# Patient Record
Sex: MALE | Race: WHITE | NOT HISPANIC OR LATINO | Employment: STUDENT | URBAN - METROPOLITAN AREA
[De-identification: names, ages, dates, MRNs, and addresses within clinical notes are randomized per-mention and may not be internally consistent; named-entity substitution may affect disease eponyms.]

---
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
## 2018-09-18 ENCOUNTER — HOSPITAL ENCOUNTER (OUTPATIENT)
Dept: RADIOLOGY | Facility: HOSPITAL | Age: 18
Discharge: HOME | End: 2018-09-18
Attending: ORTHOPAEDIC SURGERY
Payer: COMMERCIAL

## 2018-09-18 ENCOUNTER — OFFICE VISIT (OUTPATIENT)
Dept: SPORTS MEDICINE | Facility: CLINIC | Age: 18
End: 2018-09-18
Payer: COMMERCIAL

## 2018-09-18 VITALS
HEART RATE: 60 BPM | HEIGHT: 73 IN | BODY MASS INDEX: 26.51 KG/M2 | SYSTOLIC BLOOD PRESSURE: 159 MMHG | DIASTOLIC BLOOD PRESSURE: 80 MMHG | WEIGHT: 200 LBS

## 2018-09-18 DIAGNOSIS — M25.521 RIGHT ELBOW PAIN: Primary | ICD-10-CM

## 2018-09-18 DIAGNOSIS — M25.521 RIGHT ELBOW PAIN: ICD-10-CM

## 2018-09-18 PROCEDURE — 99999 PR PBB SHADOW E&M-EST. PATIENT-LVL III: CPT | Mod: PBBFAC,,, | Performed by: ORTHOPAEDIC SURGERY

## 2018-09-18 PROCEDURE — 99204 OFFICE O/P NEW MOD 45 MIN: CPT | Mod: S$GLB,,, | Performed by: ORTHOPAEDIC SURGERY

## 2018-09-18 PROCEDURE — 73080 X-RAY EXAM OF ELBOW: CPT | Mod: TC,FY,PO,RT

## 2018-09-18 PROCEDURE — 73080 X-RAY EXAM OF ELBOW: CPT | Mod: 26,RT,, | Performed by: RADIOLOGY

## 2018-09-18 NOTE — PROGRESS NOTES
"CC: RIGHT elbow pain     18 y.o. Male presents as a new patient to me. 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 Right medial elbow pain x 2-3 mos of atraumatic onset. Pain worsened on Sunday during practice; denies discrete injury or feeling a pop on a particular throw. Did not practice yesterday or today due to pain. Pain is medially based & worse with throwing, terminal flexion/extension and forearm upination/pronation activities. Mild pain with batting. Better with rest. Denies neck pain or radicular symptoms. No shoulder complaints. No prior problems. Treatment thus far has included activity modifications, rest, and oral medication.  Here today to discuss diagnosis and treatment options.     Negative for tobacco.   Negative for diabetes.    Pain Score:   1    PAST MEDICAL HISTORY:   History reviewed. No pertinent past medical history.    PAST SURGICAL HISTORY:  History reviewed. No pertinent surgical history.    FAMILY HISTORY:  No family history on file.    MEDICATIONS:  No current outpatient medications on file.    ALLERGIES:  Review of patient's allergies indicates:  No Known Allergies       REVIEW OF SYSTEMS:  Constitution: Negative. Negative for chills, fever and night sweats.    Hematologic/Lymphatic: Negative for bleeding problem. Does not bruise/bleed easily.   Skin: Negative for dry skin, itching and rash.   Musculoskeletal: Negative for falls. Positive for right elbow pain and  muscle weakness.     PHYSICAL EXAMINATION:  Vitals:  BP (!) 159/80   Pulse 60   Ht 6' 1" (1.854 m)   Wt 90.7 kg (200 lb)   BMI 26.39 kg/m²    General: Well-developed well-nourished 18 y.o. malein no acute distress   Cardiovascular: Regular rhythm by palpation of distal pulse, normal color and temperature, no concerning varicosities on symptomatic side   Lungs: No labored breathing or wheezing appreciated   Neuro: Alert and oriented ×3   Psychiatric: well oriented to person, place and time, demonstrates normal mood " and affect   Skin: No rashes, lesions or ulcers, normal temperature, turgor, and texture on uninvolved extremity    Ortho/SPM Exam  Examination of the right elbow demonstrates full extension and flexion. Point tenderness over the proximal UCL insertion.  Minimal tenderness over the sublime tubercle. Mild medial epicondyle specific tenderness as well. Mild pain with resisted wrist flexion. Normal stability to varus and valgus stress but pain present on valgus stress testing.  Positive milk test for pain over the UCL.  Positive valgus shear test at 30-60 degrees of flexion again over the UCL.  No instability appreciated.  Negative Tinel over the ulnar nerve. Shoulder passive ER to 115, IR to 30 on the right. Passive ER to 90, IR to 60 on the left.  Total arc of motion loss of 5°.  Fairly good core and lower extremity strength. No scapular dyskinesis or winging.  Intact rotator cuff strength. No tenderness over the biceps.    IMAGING:  Xrays including AP/Lateral/Radial Capitellar views are ordered / images reviewed by me:   Small ossific fragment in the region of the proximal UCL suggestive of chronic injury    ASSESSMENT:      ICD-10-CM ICD-9-CM   1. Right elbow pain M25.521 719.42     Exam suspicious for proximal UCL injury, possible acute on chronic    PLAN:     -Recommend MRI to further evaluate   -RTC after study to review images.  Shut down from throwing now.  -Case discussed with team ATC   -All questions answered    Procedures

## 2018-09-18 NOTE — LETTER
September 20, 2018      [facility]            [facility]  [address]  [facility] [address]  Phone: [phone]          Patient: Oskar Lilly   MR Number: [id number]   YOB: 2000   Date of Visit: 9/18/2018       Dear [facility] :    Thank you for referring Oskar Lilly to me for evaluation. Attached you will find relevant portions of my assessment and plan of care.    If you have questions, please do not hesitate to call me. I look forward to following Oskar Lilly along with you.    Sincerely,    CHON Melendez MD    Enclosure  CC:  No Recipients    If you would like to receive this communication electronically, please contact [email or web address] or [phone] to request more information on [employer] access.    For providers and/or their staff who would like to refer a patient to [facility], please contact us through our one-stop-shop provider referral line, Liliya Win, at [phone].    If you feel you have received this communication in error or would no longer like to receive these types of communications, please e-mail [email or web address]

## 2018-09-19 ENCOUNTER — HOSPITAL ENCOUNTER (OUTPATIENT)
Dept: RADIOLOGY | Facility: HOSPITAL | Age: 18
Discharge: HOME | End: 2018-09-19
Attending: ORTHOPAEDIC SURGERY
Payer: COMMERCIAL

## 2018-09-19 DIAGNOSIS — M25.521 RIGHT ELBOW PAIN: ICD-10-CM

## 2018-09-19 PROCEDURE — 73221 MRI JOINT UPR EXTREM W/O DYE: CPT | Mod: 26,RT,, | Performed by: RADIOLOGY

## 2018-09-19 PROCEDURE — 73221 MRI JOINT UPR EXTREM W/O DYE: CPT | Mod: TC,RT

## 2018-09-20 ENCOUNTER — OFFICE VISIT (OUTPATIENT)
Dept: SPORTS MEDICINE | Facility: CLINIC | Age: 18
End: 2018-09-20
Payer: COMMERCIAL

## 2018-09-20 VITALS
WEIGHT: 200 LBS | BODY MASS INDEX: 26.51 KG/M2 | HEART RATE: 59 BPM | HEIGHT: 73 IN | DIASTOLIC BLOOD PRESSURE: 76 MMHG | SYSTOLIC BLOOD PRESSURE: 148 MMHG

## 2018-09-20 DIAGNOSIS — S53.441A SPRAIN OF ULNAR COLLATERAL LIGAMENT OF RIGHT ELBOW, INITIAL ENCOUNTER: Primary | ICD-10-CM

## 2018-09-20 PROCEDURE — 99214 OFFICE O/P EST MOD 30 MIN: CPT | Mod: S$GLB,,, | Performed by: ORTHOPAEDIC SURGERY

## 2018-09-20 PROCEDURE — 3008F BODY MASS INDEX DOCD: CPT | Mod: CPTII,S$GLB,, | Performed by: ORTHOPAEDIC SURGERY

## 2018-09-20 PROCEDURE — 99999 PR PBB SHADOW E&M-EST. PATIENT-LVL III: CPT | Mod: PBBFAC,,, | Performed by: ORTHOPAEDIC SURGERY

## 2018-09-20 NOTE — PROGRESS NOTES
"CC: RIGHT elbow pain     18 y.o. Male presents who presents today to review his elbow MRI. 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     Previous History: Right medial elbow pain x 2-3 mos of atraumatic onset. Pain worsened on Sunday during practice; denies discrete injury or feeling a pop on a particular throw. Did not practice yesterday or today due to pain. Pain is medially based & worse with throwing, terminal flexion/extension and forearm upination/pronation activities. Denies sig pain with batting. Better with rest. Denies neck pain or radicular symptoms. No shoulder complaints. No prior problems. Treatment thus far has included activity modifications, rest, and oral medication. No current discomfort or pain at rest.  No ulnar nerve symptoms.    Negative for tobacco.   Negative for diabetes.    Pain Score:   6    PAST MEDICAL HISTORY:   History reviewed. No pertinent past medical history.    PAST SURGICAL HISTORY:  History reviewed. No pertinent surgical history.    FAMILY HISTORY:  History reviewed. No pertinent family history.    MEDICATIONS:  No current outpatient medications on file.    ALLERGIES:  Review of patient's allergies indicates:  No Known Allergies       REVIEW OF SYSTEMS:  Constitution: Negative. Negative for chills, fever and night sweats.    Hematologic/Lymphatic: Negative for bleeding problem. Does not bruise/bleed easily.   Skin: Negative for dry skin, itching and rash.   Musculoskeletal: Negative for falls. Positive for right elbow pain and  muscle weakness.     PHYSICAL EXAMINATION:  Vitals:  BP (!) 148/76   Pulse (!) 59   Ht 6' 1" (1.854 m)   Wt 90.7 kg (200 lb)   BMI 26.39 kg/m²    General: Well-developed well-nourished 18 y.o. malein no acute distress   Cardiovascular: Regular rhythm by palpation of distal pulse, normal color and temperature, no concerning varicosities on symptomatic side   Lungs: No labored breathing or wheezing appreciated   Neuro: Alert and oriented ×3 "   Psychiatric: well oriented to person, place and time, demonstrates normal mood and affect   Skin: No rashes, lesions or ulcers, normal temperature, turgor, and texture on uninvolved extremity    Ortho/SPM Exam  Examination of the right elbow demonstrates full extension and flexion. Point tenderness over the proximal UCL insertion.  Minimal tenderness over the sublime tubercle. Mild medial epicondyle specific tenderness as well. Mild pain with resisted wrist flexion. Normal stability to varus and valgus stress but pain present on valgus stress testing.  Positive milk test for pain over the UCL.  Positive valgus shear test at 30-60 degrees of flexion again over the UCL.  No instability appreciated.  Negative Tinel over the ulnar nerve. Shoulder passive ER to 115, IR to 30 on the right. Passive ER to 90, IR to 60 on the left.  Total arc of motion loss of 5°.  Fairly good core and lower extremity strength. No scapular dyskinesis or winging.  Intact rotator cuff strength. No tenderness over the biceps.    IMAGING:  Xrays including AP/Lateral/Radial Capitellar of R Elbow views are ordered / images reviewed by me:   Small ossific fragment in the region of the proximal UCL suggestive of chronic injury    MRI of R elbow reviewed by me and discussed with patient. Images show:   1. Suspected partial tear of the proximal UCL, likely chronic or acute on chronic.  Consider further evaluation with MRI arthrogram.   2. Mild myotendinous edema of the common flexor tendon.    Agree with acute on chronic partial tear of the UCL proximally.    ASSESSMENT:      ICD-10-CM ICD-9-CM   1. Sprain of ulnar collateral ligament of right elbow, initial encounter S53.441A 841.1       PLAN:     MRI findings were discussed with the patient. This is a proximal sided partial lesion, acute on chronic.  In this case, an initial trial of conservative treatment is recommended.  Discussed a forced period of rest for the next 6 weeks followed by a 6 week  period of gradual progression back to full throwing.  Also discussed possible PRP injection now.  The patient would like to hold off on this.  Not a necessity from my standpoint.  The patient denies any pain with hitting and I think he can participate in that now.  Plan discussed with his  and also the .  He is in agreement.  He understands the inherent risk for continued or recurrent problems eventually requiring surgery. We will continue to follow along closely.    Oskar denies desire for me to speak with family.    Procedures

## 2018-09-24 ENCOUNTER — TELEPHONE (OUTPATIENT)
Dept: SPORTS MEDICINE | Facility: CLINIC | Age: 18
End: 2018-09-24

## 2018-09-24 DIAGNOSIS — S53.449A: Primary | ICD-10-CM

## 2018-09-28 ENCOUNTER — CLINICAL SUPPORT (OUTPATIENT)
Dept: REHABILITATION | Facility: HOSPITAL | Age: 18
End: 2018-09-28
Attending: ORTHOPAEDIC SURGERY
Payer: COMMERCIAL

## 2018-09-28 DIAGNOSIS — M25.621 ELBOW STIFFNESS, RIGHT: ICD-10-CM

## 2018-09-28 DIAGNOSIS — M25.521 ELBOW PAIN, RIGHT: ICD-10-CM

## 2018-09-28 DIAGNOSIS — M62.81 MUSCLE WEAKNESS OF RIGHT UPPER EXTREMITY: ICD-10-CM

## 2018-09-28 PROCEDURE — 97161 PT EVAL LOW COMPLEX 20 MIN: CPT | Performed by: PHYSICAL THERAPIST

## 2018-09-28 PROCEDURE — 97110 THERAPEUTIC EXERCISES: CPT | Performed by: PHYSICAL THERAPIST

## 2018-09-29 PROBLEM — M62.81 MUSCLE WEAKNESS OF RIGHT UPPER EXTREMITY: Status: ACTIVE | Noted: 2018-09-29

## 2018-09-29 PROBLEM — M25.621 ELBOW STIFFNESS, RIGHT: Status: ACTIVE | Noted: 2018-09-29

## 2018-09-29 PROBLEM — M25.521 ELBOW PAIN, RIGHT: Status: ACTIVE | Noted: 2018-09-29

## 2018-09-29 NOTE — PLAN OF CARE
"[facility]  Physical Therapy Initial Evaluation    Name: Tabitha Lilly  Clinic Number: [id number]    Therapy Diagnosis:   Encounter Diagnoses   Name Primary?    Elbow pain, right     Elbow stiffness, right     Muscle weakness of right upper extremity      Physician: CHON Melendez MD    Physician Orders: PT Eval and Treat   Medical Diagnosis: S53.449A (ICD-10-CM) - Sprain of ulnar collateral ligament of elbow, unspecified laterality, initial encounter  Evaluation Date: 9/28/2018  Authorization Period Expiration: 12/31//2018  Plan of Care Certification Period: 11/28/2018  Visit # / Visits authorized: 1 / 20    Time In: 16:00  Time Out: 17:00  Total Billable Time: 60 minutes    Precautions: Standard    Subjective   Date of onset: 2-3 month hx   History of current condition - TABITHA reports progressive worsening of R elbow pain from throwing a baseball leading him to see [facility] (+) for above dx and pt referred to PT     No past medical history on file.  Tabitha Lilly  has no past surgical history on file.    Tabitha currently has no medications in their medication list.    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  Prior Level of Function: I  Current Level of Function: pain with ADLs, recreation / leisure    Pts goals: "Heal it, prevent future problems"    Objective     Observation: slight guarding R e' / UE, no muscle atrophy or effusion, fair unsupported sitting posture    No overt scap dyskiensis    Range of Motion (extension/neutral/flexion):    Right Left   Elbow PROM: 0 / 15 / 135 degrees 0 / 5 / 140 degrees     PROM s' ER R 121 L 101  IR R 47 L 60  FIR -5"      Strength:    Right Left " Comment   Elbow Extension: 4-/5 5/5    Elbow Flexion: 4-/5 5/5    Forearm supination 4-/5 5/5    Forearm pronation  4-/5 5/5      Special Tests: (+) moving valgus stress test    Palpation: (+) TTP UCL region R elbow     TREATMENT   Treatment Time In: 16:30  Treatment Time Out: 16:45  Total Treatment time separate from Evaluation time:15'     OSKAR received therapeutic exercises to develop ROM and flexibility for 15 minutes including:    UBE 6' 3.0   w' flex stretch 3x:15  Tennis e' stretch 3x:15  Stick forearm   Cross body stretch 5x;15  FIR 5x:15  Sleeper stretch 5x:15  Sleeper hang 2'x2 2#    CP to go    Education     Home Exercises and Patient Education Provided    Education provided re:   - progress towards goals   - role of therapy in multi - disciplinary team, goals for therapy  No spiritual or educational barriers to learning provided    Written Home Exercises Provided: yes.  Exercises were reviewed and OSKAR was able to demonstrate them prior to the end of the session.   Pt received a written copy of exercises to perform at home. OSKAR demonstrated good  understanding of the education provided.     Assessment   Oskar is a 18 y.o. male referred to outpatient Physical Therapy with a medical diagnosis of R e' UCL sprain . Pt presents with     Pain  Stiffness  Decreased ROM  Decreased strength  Decreased postural awareness  Decreased functional status     Pt prognosis is Good.   Pt will benefit from skilled outpatient Physical Therapy to address the deficits stated above and in the chart below, provide pt/family education, and to maximize pt's level of independence.     Plan of care discussed with patient: Yes  Pt's spiritual, cultural and educational needs considered and pt agreeable to plan of care and goals as stated below:     Anticipated Barriers for therapy: none    Medical Necessity is demonstrated by the following  History  Co-morbidities and personal factors that may impact the plan of care Co-morbidities:    none    Personal Factors:   no deficits     low   Examination  Body Structures and Functions, activity limitations and participation restrictions that may impact the plan of care Body Regions:   upper extremities    Body Systems:    ROM  strength  motor control    Participation Restrictions:   Recreation/ leisure    Activity limitations:   Mobility  lifting and carrying objects    Self care  no deficits    Domestic Life  no deficits    Community and Social Life  recreation and leisure         low   Clinical Presentation stable and uncomplicated low   Decision Making/ Complexity Score: low     Goals   Short-Term Goals: 4 weeks  - The patient will be independent with initial home exercise program.  - The patient will demonstrate good unsupported sitting posture with min verbal cues for 30 minutes.  - The patient will increase ROM = to L elbow to perform ADLs  with pain < 0/10.  - The patient will increase strength by 1/2 muscle grade  to perform ADLs with pain < 0/10.    Long-Term Goals: 8 weeks  - The patient will be independent with home exercise program and symptom management.  - The patient will increase strength = to L UE to perform recreation / leisure with pain < 0/10      Plan   Certification Period: 9/28/2018 to 11/28/2018    Outpatient Physical Therapy 1 times weekly for 8 weeks to include the following interventions: patient education, Manual Therapy, Moist Heat/ Ice, Neuromuscular Re-ed, Therapeutic Activites and Therapeutic Exercise.     Eduin Padilla, PT

## 2018-10-03 ENCOUNTER — CLINICAL SUPPORT (OUTPATIENT)
Dept: REHABILITATION | Facility: HOSPITAL | Age: 18
End: 2018-10-03
Attending: ORTHOPAEDIC SURGERY
Payer: COMMERCIAL

## 2018-10-03 DIAGNOSIS — M62.81 MUSCLE WEAKNESS OF RIGHT UPPER EXTREMITY: ICD-10-CM

## 2018-10-03 DIAGNOSIS — M25.521 ELBOW PAIN, RIGHT: Primary | ICD-10-CM

## 2018-10-03 DIAGNOSIS — M25.621 ELBOW STIFFNESS, RIGHT: ICD-10-CM

## 2018-10-03 PROCEDURE — 97110 THERAPEUTIC EXERCISES: CPT | Performed by: PHYSICAL THERAPIST

## 2018-10-04 NOTE — PROGRESS NOTES
Physical Therapy Daily Treatment Note     Visit Date: 10/3/2018    Name: Oskar Lilly  Clinic Number: [id number]    Therapy Diagnosis:   Encounter Diagnoses   Name Primary?    Elbow pain, right Yes    Elbow stiffness, right     Muscle weakness of right upper extremity      Physician: CHON Melendez MD      Physician Orders: PT Eval and Treat   Medical Diagnosis: S53.449A (ICD-10-CM) - Sprain of ulnar collateral ligament of elbow, unspecified laterality, initial encounter  Evaluation Date: 9/28/2018  Authorization Period Expiration: 12/31//2018  Plan of Care Certification Period: 11/28/2018  Visit # / Visits authorized: 1 / 20    Time In: 16:00  Time Out: 17:00  Total Billable Time: 60 minutes    Precautions: Standard    Subjective      Pt reports continued soreness in R elbow this PM    he was compliant with home exercise program given last session.   Response to previous treatment:good   Functional change: none    Pain: 5/10  Location: right elbow      Objective     Measurements taken:      OSKAR received therapeutic exercises to develop strength, endurance, ROM, flexibility and core stabilization for 45  minutes including:    Stick forearm volar/dorsal surfaces  w' flexor stretch 3x:15  Tennis elbow stretch 3x:15  scaption 3x10-15 3#  TB ER 3xburn green   TB IR 3x15 blue   Plank push up plus 3x5  S/L ER 3x burn 3#  Prone s' ext/ER 3x15 2#  Prone row 3x15 4#  Prone HAB/ER 3x10 1#    CP to go R e'    Home Exercises Provided and Patient Education Provided     Education provided:   - yes    Written Home Exercises Provided: .RC/scap program   Exercises were reviewed and OSKAR was able to demonstrate them prior to the end of the session.  OSKAR demonstrated good \ understanding of the education provided.     See EMR under hand out given to pt for exercises provided .      Assessment     nicole Rx without increase in sxs  Pain scale 2/10 in R e' post Rx (decreased)    OSKAR is progressing well towards his goals.   Pt  prognosis is Good.     Pt will continue to benefit from skilled outpatient physical therapy to address the deficits listed in the problem list box on initial evaluation, provide pt/family education and to maximize pt's level of independence in the home and community environment.     Pt's spiritual, cultural and educational needs considered and pt agreeable to plan of care and goals.    Anticipated barriers to physical therapy: none    Goals:     Goals   Short-Term Goals: 4 weeks  - The patient will be independent with initial home exercise program.  - The patient will demonstrate good unsupported sitting posture with min verbal cues for 30 minutes.  - The patient will increase ROM = to L elbow to perform ADLs  with pain < 0/10.  - The patient will increase strength by 1/2 muscle grade  to perform ADLs with pain < 0/10.    Long-Term Goals: 8 weeks  - The patient will be independent with home exercise program and symptom management.  - The patient will increase strength = to L UE to perform recreation / leisure with pain < 0/10      Plan     Continue with established Plan of Care towards PT goals with focus on decreasing pain, increasing ROM, strength, neuromuscular control and functional status       Eduin Padilla, PT

## 2018-10-10 ENCOUNTER — CLINICAL SUPPORT (OUTPATIENT)
Dept: REHABILITATION | Facility: HOSPITAL | Age: 18
End: 2018-10-10
Attending: ORTHOPAEDIC SURGERY
Payer: COMMERCIAL

## 2018-10-10 DIAGNOSIS — M25.521 ELBOW PAIN, RIGHT: Primary | ICD-10-CM

## 2018-10-10 DIAGNOSIS — M62.81 MUSCLE WEAKNESS OF RIGHT UPPER EXTREMITY: ICD-10-CM

## 2018-10-10 DIAGNOSIS — M25.621 ELBOW STIFFNESS, RIGHT: ICD-10-CM

## 2018-10-10 PROCEDURE — 97110 THERAPEUTIC EXERCISES: CPT | Performed by: PHYSICAL THERAPIST

## 2018-10-10 NOTE — PROGRESS NOTES
Physical Therapy Daily Treatment Note     Visit Date: 10/10/2018    Name: Oskar Lilly  Clinic Number: [id number]    Therapy Diagnosis:   No diagnosis found.  Physician: CHON Melendez MD      Physician Orders: PT Eval and Treat   Medical Diagnosis: S53.449A (ICD-10-CM) - Sprain of ulnar collateral ligament of elbow, unspecified laterality, initial encounter  Evaluation Date: 9/28/2018  Authorization Period Expiration: 12/31//2018  Plan of Care Certification Period: 11/28/2018  Visit # / Visits authorized: 3 / 20    Time In: 12:00  Time Out: 12:30  Total Billable Time: 30 minutes    Precautions: Standard    Subjective      Pt reports continued soreness in R elbow this PM    he was compliant with home exercise program given last session.   Response to previous treatment:good   Functional change: none    Pain: 1/10  (improved)  Location: right elbow      Objective     Measurements taken:   None taken this PM     OSKAR received therapeutic exercises to develop strength, endurance, ROM, flexibility and core stabilization for 45  minutes including:    Stick forearm volar/dorsal surfaces  w' flexor stretch 3x:15  Tennis elbow stretch 3x:15  MR e' flex 3D 3x15  MR E' ext 2D 3x15  MR sup/pron 3x15  MR w' 4D 3xburn   MR finger flex/ext 3x15    CP to go R e'    Home Exercises Provided and Patient Education Provided     Education provided:   - yes    Written Home Exercises Provided: .RC/scap program   Exercises were reviewed and OSKAR was able to demonstrate them prior to the end of the session.  OSKAR demonstrated good \ understanding of the education provided.     See EMR under hand out given to pt for exercises provided .      Assessment     nicole Rx without increase in sxs  Significant improvement in e' sxs overall     OSKAR is progressing well towards his goals.   Pt prognosis is Good.     Pt will continue to benefit from skilled outpatient physical therapy to address the deficits listed in the problem list box on initial  evaluation, provide pt/family education and to maximize pt's level of independence in the home and community environment.     Pt's spiritual, cultural and educational needs considered and pt agreeable to plan of care and goals.    Anticipated barriers to physical therapy: none    Goals:     Goals   Short-Term Goals: 4 weeks  - The patient will be independent with initial home exercise program.  - The patient will demonstrate good unsupported sitting posture with min verbal cues for 30 minutes.  - The patient will increase ROM = to L elbow to perform ADLs  with pain < 0/10.  - The patient will increase strength by 1/2 muscle grade  to perform ADLs with pain < 0/10.    Long-Term Goals: 8 weeks  - The patient will be independent with home exercise program and symptom management.  - The patient will increase strength = to L UE to perform recreation / leisure with pain < 0/10      Plan     Continue with established Plan of Care towards PT goals with focus on decreasing pain, increasing ROM, strength, neuromuscular control and functional status       Eduin Padilla, PT

## 2018-10-17 ENCOUNTER — CLINICAL SUPPORT (OUTPATIENT)
Dept: REHABILITATION | Facility: HOSPITAL | Age: 18
End: 2018-10-17
Attending: ORTHOPAEDIC SURGERY
Payer: COMMERCIAL

## 2018-10-17 DIAGNOSIS — M25.621 ELBOW STIFFNESS, RIGHT: ICD-10-CM

## 2018-10-17 DIAGNOSIS — M25.521 ELBOW PAIN, RIGHT: Primary | ICD-10-CM

## 2018-10-17 DIAGNOSIS — M62.81 MUSCLE WEAKNESS OF RIGHT UPPER EXTREMITY: ICD-10-CM

## 2018-10-17 PROCEDURE — 97110 THERAPEUTIC EXERCISES: CPT | Performed by: PHYSICAL THERAPIST

## 2018-10-17 NOTE — PROGRESS NOTES
"  Physical Therapy Daily Treatment Note     Visit Date: 10/17/2018    Name: Oskar Lilly  Clinic Number: [id number]    Therapy Diagnosis:   Encounter Diagnoses   Name Primary?    Elbow pain, right Yes    Elbow stiffness, right     Muscle weakness of right upper extremity      Physician: CHON Melendez MD      Physician Orders: PT Eval and Treat   Medical Diagnosis: S53.449A (ICD-10-CM) - Sprain of ulnar collateral ligament of elbow, unspecified laterality, initial encounter  Evaluation Date: 9/28/2018  Authorization Period Expiration: 12/31//2018  Plan of Care Certification Period: 11/28/2018  Visit # / Visits authorized: 4 / 20    Time In: 11:30  Time Out: 12:30  Total Billable Time: 45 minutes    Precautions: Standard    Subjective     Pt reports decreased soreness in R elbow this  AM, "It's been pain free for 5 days"     he was compliant with home exercise program given last session.   Response to previous treatment:good   Functional change: none    Pain: 0/10  (improved)  Location: right elbow      Objective     Measurements taken:   PROM e' flex reproduced pain in e'     Oskar received therapeutic exercises to develop strength, endurance, ROM, flexibility and core stabilization for 45  minutes including:    Lacrosse ball  forearm volar/dorsal surfaces  y-t-w 3x10 2#  w' flexor stretch 3x:15  Tennis elbow stretch 3x:15  STM medial elbow flexor / pronatior region   MR e' flex 3D 3x15  MR E' ext 2D 3x15  MR sup/pron 3x15  MR S/L ER 3xburn  MR prone s' ext / ER superset to prone row 3x15   MR w' 4D 3xburn         CP to go R e'    Home Exercises Provided and Patient Education Provided     Education provided:   - yes    Written Home Exercises Provided: .RC/scap program   Exercises were reviewed and Oskar was able to demonstrate them prior to the end of the session.  Oskar demonstrated good \ understanding of the education provided.     See EMR under hand out given to pt for exercises provided .      Assessment "     nicole Rx without increase in sxs  Decreased pain with PROM e' flex following STM     Oskar is progressing well towards his goals.   Pt prognosis is Good.     Pt will continue to benefit from skilled outpatient physical therapy to address the deficits listed in the problem list box on initial evaluation, provide pt/family education and to maximize pt's level of independence in the home and community environment.     Pt's spiritual, cultural and educational needs considered and pt agreeable to plan of care and goals.    Anticipated barriers to physical therapy: none    Goals:     Goals   Short-Term Goals: 4 weeks  - The patient will be independent with initial home exercise program.  - The patient will demonstrate good unsupported sitting posture with min verbal cues for 30 minutes.  - The patient will increase ROM = to L elbow to perform ADLs  with pain < 0/10.  - The patient will increase strength by 1/2 muscle grade  to perform ADLs with pain < 0/10.    Long-Term Goals: 8 weeks  - The patient will be independent with home exercise program and symptom management.  - The patient will increase strength = to L UE to perform recreation / leisure with pain < 0/10      Plan     Continue with established Plan of Care towards PT goals with focus on decreasing pain, increasing ROM, strength, neuromuscular control and functional status     Prime  strengthening or UE plyometrics if no pain with PROM e' flex     Eduin Padilla, PT

## 2018-10-24 ENCOUNTER — CLINICAL SUPPORT (OUTPATIENT)
Dept: REHABILITATION | Facility: HOSPITAL | Age: 18
End: 2018-10-24
Attending: ORTHOPAEDIC SURGERY
Payer: COMMERCIAL

## 2018-10-24 DIAGNOSIS — M62.81 MUSCLE WEAKNESS OF RIGHT UPPER EXTREMITY: ICD-10-CM

## 2018-10-24 DIAGNOSIS — M25.621 ELBOW STIFFNESS, RIGHT: ICD-10-CM

## 2018-10-24 DIAGNOSIS — M25.521 ELBOW PAIN, RIGHT: Primary | ICD-10-CM

## 2018-10-24 PROCEDURE — 97530 THERAPEUTIC ACTIVITIES: CPT | Performed by: PHYSICAL THERAPIST

## 2018-10-24 NOTE — PROGRESS NOTES
Physical Therapy Daily Treatment Note     Visit Date: 10/24/2018    Name: Oskar Lilly  Clinic Number: [id number]    Therapy Diagnosis:   Encounter Diagnoses   Name Primary?    Elbow pain, right Yes    Elbow stiffness, right     Muscle weakness of right upper extremity      Physician: CHON Melendez MD      Physician Orders: PT Eval and Treat   Medical Diagnosis: S53.449A (ICD-10-CM) - Sprain of ulnar collateral ligament of elbow, unspecified laterality, initial encounter  Evaluation Date: 9/28/2018  Authorization Period Expiration: 12/31//2018  Plan of Care Certification Period: 11/28/2018  Visit # / Visits authorized: 5 / 20    Time In: 11:30  Time Out: 12:30  Total Billable Time: 45 minutes    Precautions: Standard    Subjective     Pt reports no c/o in R e' this AM, willing to attempt Xavi as tolerated     he was compliant with home exercise program given last session.   Response to previous treatment:good   Functional change: none    Pain: 0/10  (improved)  Location: right elbow      Objective     Measurements taken:  Again  PROM e' flex reproduced pain in e'     Patient participated in dynamic functional therapeutic activities to improve functional performance for  45 minutes. Including:     stick  forearm volar/dorsal surfaces  TB ER 90-90 plyo blue 3xburn superset to w' ext plyo 3xburn  TB IR 90-90 plum 3x15 superset to w' flex plyo 3xburn   bblade up and POS 3xburn medium  w' flexor stretch 3x:15  Tennis elbow stretch 3x:15  STM medial elbow flexor / pronatior region   UE plyo series:  CP   3x20 8lbs  diag 3x10 2kg  OH 3x10 2kg  90-90 red 3x20  S/L ER 3xburn red  Prone ER 90-90 3xburn red  Ball drop w' ext 3xburn red  w' flex plyo red 3x30    CP to go R e'    Home Exercises Provided and Patient Education Provided     Education provided:   - yes    Written Home Exercises Provided: .RC/scap program   Exercises were reviewed and Oskar was able to demonstrate them prior to the end of the session.  Oskar  demonstrated good \ understanding of the education provided.     See EMR under hand out given to pt for exercises provided .      Assessment     nicole Rx without increase in sxs able to perform UE plyometrics without pain in R e'       Oskar is progressing well towards his goals.   Pt prognosis is Good.     Pt will continue to benefit from skilled outpatient physical therapy to address the deficits listed in the problem list box on initial evaluation, provide pt/family education and to maximize pt's level of independence in the home and community environment.     Pt's spiritual, cultural and educational needs considered and pt agreeable to plan of care and goals.    Anticipated barriers to physical therapy: none    Goals:     Goals   Short-Term Goals: 4 weeks  - The patient will be independent with initial home exercise program.  - The patient will demonstrate good unsupported sitting posture with min verbal cues for 30 minutes.  - The patient will increase ROM = to L elbow to perform ADLs  with pain < 0/10.  - The patient will increase strength by 1/2 muscle grade  to perform ADLs with pain < 0/10.    Long-Term Goals: 8 weeks  - The patient will be independent with home exercise program and symptom management.  - The patient will increase strength = to L UE to perform recreation / leisure with pain < 0/10      Plan     Continue with established Plan of Care towards PT goals with focus on decreasing pain, increasing ROM, strength, neuromuscular control and functional status     Prime  strengthening or UE plyometrics     Eduin Padilla, PT

## 2018-11-07 ENCOUNTER — CLINICAL SUPPORT (OUTPATIENT)
Dept: REHABILITATION | Facility: HOSPITAL | Age: 18
End: 2018-11-07
Attending: ORTHOPAEDIC SURGERY
Payer: COMMERCIAL

## 2018-11-07 DIAGNOSIS — M62.81 MUSCLE WEAKNESS OF RIGHT UPPER EXTREMITY: ICD-10-CM

## 2018-11-07 DIAGNOSIS — M25.621 ELBOW STIFFNESS, RIGHT: ICD-10-CM

## 2018-11-07 DIAGNOSIS — M25.521 ELBOW PAIN, RIGHT: Primary | ICD-10-CM

## 2018-11-07 PROCEDURE — 97110 THERAPEUTIC EXERCISES: CPT | Performed by: PHYSICAL THERAPIST

## 2018-11-08 NOTE — PROGRESS NOTES
Physical Therapy Daily Treatment Note     Visit Date: 11/7/2018    Name: Oskar Lilly  Clinic Number: [id number]    Therapy Diagnosis:   Encounter Diagnoses   Name Primary?    Elbow pain, right Yes    Elbow stiffness, right     Muscle weakness of right upper extremity      Physician: CHON Melendez MD      Physician Orders: PT Eval and Treat   Medical Diagnosis: S53.449A (ICD-10-CM) - Sprain of ulnar collateral ligament of elbow, unspecified laterality, initial encounter  Evaluation Date: 9/28/2018  Authorization Period Expiration: 12/31//2018  Plan of Care Certification Period: 11/28/2018  Visit # / Visits authorized: 6 / 20    Time In: 10:30  Time Out: 11:30  Total Billable Time: 45 minutes    Precautions: Standard    Subjective     Pt reports no change in R e' condition this AM    he was compliant with home exercise program given last session.   Response to previous treatment:good   Functional change: none    Pain: 0/10  At rest   Location: right elbow      Objective     Measurements taken:  Pain reproduction continues with moving valgus stress test    Patient received  therapy to increase strength x 45'  with  activities as follows:     stick  forearm volar/dorsal surfaces  TB ER 90-90 plyo blue 3xburn superset to w' ext plyo 3xburn  TB IR 90-90 plum 3x15 superset to w' flex plyo 3xburn   Push ups 3x10 1/2 range to wt bench   DB BP 1/2 range on green PB 3x15 20#  CC LPD 6p 3x15  CC row 6p 3x15  CC U tri press 3-4p 3xburn R/L   CC U bi curl  3-4p 3xburn R/L   S/L ER 4# 3xburn R/L     CP to go R e'    Home Exercises Provided and Patient Education Provided     Education provided:   - yes    Written Home Exercises Provided: .RC/scap program   Exercises were reviewed and Oskar was able to demonstrate them prior to the end of the session.  Oskar demonstrated good \ understanding of the education provided.     See EMR under hand out given to pt for exercises provided .      Assessment     nicole Rx without increase in  sxs able to perform UE prime  strengthening without sxs in R e'       Ross is progressing well towards his goals.   Pt prognosis is Good.     Pt will continue to benefit from skilled outpatient physical therapy to address the deficits listed in the problem list box on initial evaluation, provide pt/family education and to maximize pt's level of independence in the home and community environment.     Pt's spiritual, cultural and educational needs considered and pt agreeable to plan of care and goals.    Anticipated barriers to physical therapy: none    Goals:     Goals   Short-Term Goals: 4 weeks  - The patient will be independent with initial home exercise program.  - The patient will demonstrate good unsupported sitting posture with min verbal cues for 30 minutes.  - The patient will increase ROM = to L elbow to perform ADLs  with pain < 0/10.  - The patient will increase strength by 1/2 muscle grade  to perform ADLs with pain < 0/10.    Long-Term Goals: 8 weeks  - The patient will be independent with home exercise program and symptom management.  - The patient will increase strength = to L UE to perform recreation / leisure with pain < 0/10      Plan     Per MD visit 9/20:  Discussed a forced period of rest for the next 6 weeks followed by a 6 week period of gradual progression back to full throwing.  Also discussed possible PRP injection now.      Continue with established Plan of Care towards PT goals with focus on decreasing pain, increasing ROM, strength, neuromuscular control and functional status     Eduin Padilla, PT

## 2018-11-14 ENCOUNTER — CLINICAL SUPPORT (OUTPATIENT)
Dept: REHABILITATION | Facility: HOSPITAL | Age: 18
End: 2018-11-14
Attending: ORTHOPAEDIC SURGERY
Payer: COMMERCIAL

## 2018-11-14 DIAGNOSIS — M62.81 MUSCLE WEAKNESS OF RIGHT UPPER EXTREMITY: ICD-10-CM

## 2018-11-14 DIAGNOSIS — M25.621 ELBOW STIFFNESS, RIGHT: ICD-10-CM

## 2018-11-14 DIAGNOSIS — M25.521 ELBOW PAIN, RIGHT: Primary | ICD-10-CM

## 2018-11-14 PROCEDURE — 97530 THERAPEUTIC ACTIVITIES: CPT | Performed by: PHYSICAL THERAPIST

## 2018-11-14 NOTE — PROGRESS NOTES
Physical Therapy Daily Treatment Note     Visit Date: 11/14/2018    Name: Oskar Lilly  Clinic Number: [id number]    Therapy Diagnosis:   Encounter Diagnoses   Name Primary?    Elbow pain, right Yes    Elbow stiffness, right     Muscle weakness of right upper extremity      Physician: CHON Melendez MD      Physician Orders: PT Eval and Treat   Medical Diagnosis: S53.449A (ICD-10-CM) - Sprain of ulnar collateral ligament of elbow, unspecified laterality, initial encounter  Evaluation Date: 9/28/2018  Authorization Period Expiration: 12/31//2018  Plan of Care Certification Period: 11/28/2018  Visit # / Visits authorized: 7 / 20    Time In: 10:30  Time Out: 11:30  Total Billable Time: 45 minutes    Precautions: Standard    Subjective     Pt reports again, no change in R elbow condition    he was compliant with home exercise program given last session.   Response to previous treatment:good   Functional change: none    Pain: 0/10  At rest   Location: right elbow      Objective     Measurements taken:  Pain reproduction continues with moving valgus stress test    Patient participated in dynamic functional therapeutic activities to improve functional performance for  45 minutes. Including:     stick  forearm volar/dorsal surfaces  TB ER 90-90 plyo blue 3xburn superset to w' ext plyo 3xburn  TB IR 90-90 plum 3x15 superset to w' flex plyo 3xburn   bblade up and POS 3xburn medium  w' flexor stretch 3x:15  Tennis elbow stretch 3x:15  STM medial elbow flexor / pronatior region   UE plyo series:  CP   3x20 8lbs  diag 3x10 2kg  OH 3x10 2kg  90-90 red 3x20  S/L ER 3xburn red  Prone ER 90-90 3xburn red  Ball drop w' ext 3xburn red  w' flex plyo red 3x30    CP to go R e'    Home Exercises Provided and Patient Education Provided     Education provided:   - yes    Written Home Exercises Provided: .RC/scap program   Exercises were reviewed and Oskar was able to demonstrate them prior to the end of the session.  Oskar demonstrated  "good \ understanding of the education provided.     See EMR under hand out given to pt for exercises provided .      Assessment     nicole Rx without increase in sxs  Again, tolerated UE plyo program without sxs in SIRI Schmitt is progressing well towards his goals.   Pt prognosis is Good.     Pt will continue to benefit from skilled outpatient physical therapy to address the deficits listed in the problem list box on initial evaluation, provide pt/family education and to maximize pt's level of independence in the home and community environment.     Pt's spiritual, cultural and educational needs considered and pt agreeable to plan of care and goals.    Anticipated barriers to physical therapy: none    Goals:     Goals   Short-Term Goals: 4 weeks  - The patient will be independent with initial home exercise program.  - The patient will demonstrate good unsupported sitting posture with min verbal cues for 30 minutes.  - The patient will increase ROM = to L elbow to perform ADLs  with pain < 0/10.  - The patient will increase strength by 1/2 muscle grade  to perform ADLs with pain < 0/10.    Long-Term Goals: 8 weeks  - The patient will be independent with home exercise program and symptom management.  - The patient will increase strength = to L UE to perform recreation / leisure with pain < 0/10      Plan     "Per MD visit 9/20:  Discussed a forced period of rest for the next 6 weeks followed by a 6 week period of gradual progression back to full throwing.  Also discussed possible PRP injection now"    Begin soft toss NV if appropriate .      Continue with established Plan of Care towards PT goals with focus on decreasing pain, increasing ROM, strength, neuromuscular control and functional status     Eduin Padilla, PT   "

## 2018-12-04 ENCOUNTER — OFFICE VISIT (OUTPATIENT)
Dept: SPORTS MEDICINE | Facility: CLINIC | Age: 18
End: 2018-12-04
Payer: COMMERCIAL

## 2018-12-04 VITALS
WEIGHT: 200 LBS | BODY MASS INDEX: 26.51 KG/M2 | HEIGHT: 73 IN | SYSTOLIC BLOOD PRESSURE: 152 MMHG | DIASTOLIC BLOOD PRESSURE: 82 MMHG | HEART RATE: 56 BPM

## 2018-12-04 DIAGNOSIS — M25.521 RIGHT ELBOW PAIN: ICD-10-CM

## 2018-12-04 DIAGNOSIS — S53.441D ELBOW SPRAIN, ULNAR COLLATERAL LIGAMENT, RIGHT, SUBSEQUENT ENCOUNTER: Primary | ICD-10-CM

## 2018-12-04 PROCEDURE — 99214 OFFICE O/P EST MOD 30 MIN: CPT | Mod: S$GLB,,, | Performed by: ORTHOPAEDIC SURGERY

## 2018-12-04 PROCEDURE — 3008F BODY MASS INDEX DOCD: CPT | Mod: CPTII,S$GLB,, | Performed by: ORTHOPAEDIC SURGERY

## 2018-12-04 PROCEDURE — 99999 PR PBB SHADOW E&M-EST. PATIENT-LVL III: CPT | Mod: PBBFAC,,, | Performed by: ORTHOPAEDIC SURGERY

## 2018-12-06 ENCOUNTER — CLINICAL SUPPORT (OUTPATIENT)
Dept: REHABILITATION | Facility: HOSPITAL | Age: 18
End: 2018-12-06
Attending: ORTHOPAEDIC SURGERY
Payer: COMMERCIAL

## 2018-12-06 DIAGNOSIS — M25.521 ELBOW PAIN, RIGHT: Primary | ICD-10-CM

## 2018-12-06 DIAGNOSIS — M62.81 MUSCLE WEAKNESS OF RIGHT UPPER EXTREMITY: ICD-10-CM

## 2018-12-06 DIAGNOSIS — M25.621 ELBOW STIFFNESS, RIGHT: ICD-10-CM

## 2018-12-06 PROCEDURE — 97530 THERAPEUTIC ACTIVITIES: CPT | Performed by: PHYSICAL THERAPIST

## 2018-12-06 NOTE — PROGRESS NOTES
Physical Therapy Daily Treatment Note     Visit Date: 12/6/2018    Name: Oskar Lilly  Clinic Number: [id number]    Therapy Diagnosis:   Encounter Diagnoses   Name Primary?    Elbow pain, right Yes    Elbow stiffness, right     Muscle weakness of right upper extremity      Physician: CHON Melendez MD      Physician Orders: PT Eval and Treat   Medical Diagnosis: S53.449A (ICD-10-CM) - Sprain of ulnar collateral ligament of elbow, unspecified laterality, initial encounter  Evaluation Date: 9/28/2018  Authorization Period Expiration: 12/31//2018  Plan of Care Certification Period: 11/28/2018  Visit # / Visits authorized: 8 / 20    Time In: 17:30  Time Out: 18:30  Total Billable Time: 45 minutes    Precautions: Standard    Subjective     Pt reports that he attempted to throw with his dad and continued to have pain in R elbow     he was compliant with home exercise program given last session.   Response to previous treatment:good   Functional change: none    Pain: 0/10  At rest   Location: right elbow      Objective     Measurements taken:  None this PM     Patient participated in dynamic functional therapeutic activities to improve functional performance for  45 minutes. Including:     R posterior shoulder stretching per HEP  w' flexor stretch 3x:15  Tennis elbow stretch 3x:15  Soft toss + video biomechanical instruction     CP to go R e'    Home Exercises Provided and Patient Education Provided     Education provided:   - yes    Written Home Exercises Provided: .soft toss 1x/day x 10' at 45 ft with corrections    Exercises were reviewed and Oskar was able to demonstrate them prior to the end of the session.  Oskar demonstrated good \ understanding of the education provided.     See EMR under hand out given to pt for exercises provided .      Assessment     nicole Rx without increase in sxs was able to soft toss without pain in R e' this PM    Pt demonstrated the following throwing biomechanical faults:    1. Arm  behind body  2. Weak glove hand  3.  Stride foot lands on heel  4.  Poor follow through     Pt able to make some corrections with PT verbal cueing     Oskar is progressing well towards his goals.   Pt prognosis is Good.     Pt will continue to benefit from skilled outpatient physical therapy to address the deficits listed in the problem list box on initial evaluation, provide pt/family education and to maximize pt's level of independence in the home and community environment.     Pt's spiritual, cultural and educational needs considered and pt agreeable to plan of care and goals.    Anticipated barriers to physical therapy: none    Goals:     Goals   Short-Term Goals: 4 weeks  - The patient will be independent with initial home exercise program.  - The patient will demonstrate good unsupported sitting posture with min verbal cues for 30 minutes.  - The patient will increase ROM = to L elbow to perform ADLs  with pain < 0/10.  - The patient will increase strength by 1/2 muscle grade  to perform ADLs with pain < 0/10.    Long-Term Goals: 8 weeks  - The patient will be independent with home exercise program and symptom management.  - The patient will increase strength = to L UE to perform recreation / leisure with pain < 0/10      Plan     Pt to soft toss and work on mechanics 1x/day x 3 days, may undergo PRP with resultant forced hold on throwing x 6 weeks     Continue with established Plan of Care towards PT goals with focus on decreasing pain, increasing ROM, strength, neuromuscular control and functional status     Eduin Padilla, PT

## 2018-12-07 ENCOUNTER — TELEPHONE (OUTPATIENT)
Dept: SPORTS MEDICINE | Facility: CLINIC | Age: 18
End: 2018-12-07

## 2018-12-11 ENCOUNTER — TELEPHONE (OUTPATIENT)
Dept: SPORTS MEDICINE | Facility: CLINIC | Age: 18
End: 2018-12-11

## 2018-12-11 NOTE — TELEPHONE ENCOUNTER
Scheduled patient for right elbow PRP UCL 12/12/18 @ 11:45AM    Ramses Renee   Sports Medicine Assistant   [facility] [place]     ----- Message from Ramses Renee MA sent at 12/11/2018  4:44 PM CST -----  Contact: David merino      ----- Message -----  From: Mara Chen  Sent: 12/11/2018   2:35 PM  To: [facility] Staff    Patient Returning Call from [facility]    Who Left Message for Patient: Ramses- regarding injections  Communication Preference: [phone]  Additional Information:

## 2018-12-11 NOTE — TELEPHONE ENCOUNTER
Called Oskar Lilly to schedule an appointment.  I was unable to reach the patient, I left patient a voicemail to return my call.    Ramses Renee   Sports Medicine Assistant   [facility] [place]     ----- Message from Elayne Sanchez sent at 12/11/2018  2:06 PM CST -----  Contact: [employer]   Pt is requesting a call back regarding injections. Pt can be reached at [phone].

## 2018-12-12 ENCOUNTER — OFFICE VISIT (OUTPATIENT)
Dept: SPORTS MEDICINE | Facility: CLINIC | Age: 18
End: 2018-12-12

## 2018-12-12 VITALS — HEIGHT: 73 IN | WEIGHT: 200 LBS | HEART RATE: 98 BPM | BODY MASS INDEX: 26.51 KG/M2

## 2018-12-12 DIAGNOSIS — S53.441A SPRAIN OF ULNAR COLLATERAL LIGAMENT OF ELBOW, RIGHT, INITIAL ENCOUNTER: Primary | ICD-10-CM

## 2018-12-12 PROCEDURE — 99999 PR PBB SHADOW E&M-EST. PATIENT-LVL III: CPT | Mod: PBBFAC,,, | Performed by: FAMILY MEDICINE

## 2018-12-12 PROCEDURE — 0232T NJX PLATELET PLASMA: CPT | Mod: CSM,S$GLB,, | Performed by: FAMILY MEDICINE

## 2018-12-12 NOTE — PROGRESS NOTES
Patient indications  Oskar Lilly, a 18 y.o. 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    Description of procedure    A) Surgical time out  A surgical time out was performed, including verification of patient ID, procedure to be performed, site and side, availability of information and equipment, review of safety issues, and the patients agreement and consent.     B) Phlebotomy, platelet harvest, and PRP preparation  Sterile technique was used to phlebotomize 120mL of the patients blood from a peripheral vein. This blood was  into density-divided layers using an "[employer] centrifuge. The layer of leukocyte poor (hematocrit 2%) PRP, a volume of 4.4mL (1.3mL use for injection procedure), was placed into a sterile syringe in preparation for injection.    C) Platelet rich plasma delivery to pathologic site  Using 1) physical examination and 2) musculoskeletal ultrasound and 3) recent MRI, the anatomy was visualized and the diseased tissue was identified and confirmed. The procedure site was marked with a skin marker. The patient was placed in position maximizing 1) physician access to pathologic tissue and 2) patient comfort. The skin about the area was sterilized with ChloraPrep (2%w/v CHG, 70% IPA). The site of needle insertion was anesthetized using vapocoolant. Under ultrasound guidance, the needle was advanced to the site of tissue pathology, and the injectate was deposited under direct visualization.    D) Post-procedure care  Following the procedure, a sterile bandage was placed over the injection site.     Complications: none      Estimated blood loss from injection procedure: none     Joint aspirate volume: 0 mL     Disposition  The patient tolerated the procedure well and there were no immediate complications. The patient was instructed to call the clinic immediately for any mild to moderate adverse side effects, or to call 911 in the event of an emergency.        Description of ultrasound utilization for needle / probe guidance  Ultrasound guidance was used for needle / probe localization. Images (and videos, if appropriate) were saved and stored for documentation. Dynamic visualization of the needle / probe was continuous throughout the procedure.    The case, procedure, and immediate post-procedure outcome were discussed with MD Rahul immediately following the procedure.     0232T    Template reviewed 18.07.30

## 2019-01-14 ENCOUNTER — TELEPHONE (OUTPATIENT)
Dept: SPORTS MEDICINE | Facility: CLINIC | Age: 19
End: 2019-01-14

## 2019-01-14 NOTE — TELEPHONE ENCOUNTER
I called and spoke with Oskar' mother regarding a throwing program for Oskar. He has not been in to see Eduin Padilla or our PT team in over a month. S/p PRP inj and rest. Set to start a ITP per our previous plan. I'd like for Oskar to start that now. I attempted to contact him via phone ([phone]) but could not reach him or leave a VM. I have discussed the case with Eduin. We will get him in this week to start the program then will be monitored with Karissa Dodge the ATC.

## 2020-08-22 ENCOUNTER — TELEPHONE (OUTPATIENT)
Dept: SPORTS MEDICINE | Facility: CLINIC | Age: 20
End: 2020-08-22

## 2020-08-22 DIAGNOSIS — Z20.822 EXPOSURE TO COVID-19 VIRUS: Primary | ICD-10-CM

## 2020-09-02 ENCOUNTER — LAB VISIT (OUTPATIENT)
Dept: SPORTS MEDICINE | Facility: CLINIC | Age: 20
End: 2020-09-02
Payer: COMMERCIAL

## 2020-09-02 DIAGNOSIS — Z20.822 EXPOSURE TO COVID-19 VIRUS: ICD-10-CM

## 2020-09-02 PROCEDURE — U0003 INFECTIOUS AGENT DETECTION BY NUCLEIC ACID (DNA OR RNA); SEVERE ACUTE RESPIRATORY SYNDROME CORONAVIRUS 2 (SARS-COV-2) (CORONAVIRUS DISEASE [COVID-19]), AMPLIFIED PROBE TECHNIQUE, MAKING USE OF HIGH THROUGHPUT TECHNOLOGIES AS DESCRIBED BY CMS-2020-01-R: HCPCS

## 2020-09-03 LAB — SARS-COV-2 RNA RESP QL NAA+PROBE: NOT DETECTED

## 2020-09-09 ENCOUNTER — OFFICE VISIT (OUTPATIENT)
Dept: SPORTS MEDICINE | Facility: CLINIC | Age: 20
End: 2020-09-09
Payer: COMMERCIAL

## 2020-09-09 VITALS
DIASTOLIC BLOOD PRESSURE: 88 MMHG | WEIGHT: 232.13 LBS | SYSTOLIC BLOOD PRESSURE: 151 MMHG | HEART RATE: 56 BPM | BODY MASS INDEX: 30.76 KG/M2 | HEIGHT: 73 IN

## 2020-09-09 DIAGNOSIS — M62.81 MUSCLE WEAKNESS OF RIGHT UPPER EXTREMITY: ICD-10-CM

## 2020-09-09 DIAGNOSIS — S53.441A ULNAR COLLATERAL LIGAMENT SPRAIN OF RIGHT ELBOW, INITIAL ENCOUNTER: ICD-10-CM

## 2020-09-09 DIAGNOSIS — M25.621 ELBOW STIFFNESS, RIGHT: Primary | ICD-10-CM

## 2020-09-09 DIAGNOSIS — M25.521 ELBOW PAIN, RIGHT: ICD-10-CM

## 2020-09-09 PROCEDURE — 3008F PR BODY MASS INDEX (BMI) DOCUMENTED: ICD-10-PCS | Mod: CPTII,S$GLB,, | Performed by: ORTHOPAEDIC SURGERY

## 2020-09-09 PROCEDURE — 99213 PR OFFICE/OUTPT VISIT, EST, LEVL III, 20-29 MIN: ICD-10-PCS | Mod: S$GLB,,, | Performed by: ORTHOPAEDIC SURGERY

## 2020-09-09 PROCEDURE — 99999 PR PBB SHADOW E&M-EST. PATIENT-LVL III: ICD-10-PCS | Mod: PBBFAC,,, | Performed by: ORTHOPAEDIC SURGERY

## 2020-09-09 PROCEDURE — 99213 OFFICE O/P EST LOW 20 MIN: CPT | Mod: S$GLB,,, | Performed by: ORTHOPAEDIC SURGERY

## 2020-09-09 PROCEDURE — 3008F BODY MASS INDEX DOCD: CPT | Mod: CPTII,S$GLB,, | Performed by: ORTHOPAEDIC SURGERY

## 2020-09-09 PROCEDURE — 99999 PR PBB SHADOW E&M-EST. PATIENT-LVL III: CPT | Mod: PBBFAC,,, | Performed by: ORTHOPAEDIC SURGERY

## 2020-09-09 NOTE — PROGRESS NOTES
Subjective:          Chief Complaint: Oskar Lilly is a 20 y.o. male who had concerns including Pain of the Left Elbow.    Oskar Lilly is a  student.The pain started 1 year ago and is becoming progressively worse. Pain is located over (points to) medial right elbow. He reports that the pain is a 0 /10 at rest pain today and not responding adequately to conservative measures which have included activity modifications, rest, and oral medication. Is affecting ADLs and limiting desired level of activity. Denies numbness, tingling, radiation, and neck pain or radicular symptoms.  Pain is 0 /10 at its worst    Mechanical symptoms:  Subjective instability: (--)   Worse with increased throwing activities.  Better with rest.   Nocturnal symptoms: (--)    Right UCL reconstruction surgeries  on 10/21/2019- Dr. Jared Duran               Review of Systems   Constitution: Negative.   HENT: Negative.    Eyes: Negative.    Cardiovascular: Negative.    Respiratory: Negative.    Endocrine: Negative.    Hematologic/Lymphatic: Negative.    Skin: Negative.    Musculoskeletal: Positive for joint pain.   Gastrointestinal: Negative.    Genitourinary: Negative.    Neurological: Negative.    Psychiatric/Behavioral: Negative.    Allergic/Immunologic: Negative.    All other systems reviewed and are negative.      Pain Related Questions  Over the past 3 days, what was your average pain during activity? (I.e. running, jogging, walking, climbing stairs, getting dressed, ect.): 0  Over the past 3 days, what was your highest pain level?: 0  Over the past 3 days, what was your lowest pain level? : 0    Other  How many nights a week are you awakened by your affected body part?: 0  Was the patient's HEIGHT measured or patient reported?: Patient Reported  Was the patient's WEIGHT measured or patient reported?: Measured      Objective:        General: Oskar is well-developed, well-nourished, appears stated age, in no acute distress, alert and oriented to  time, place and person.     General    Vitals reviewed.  Constitutional: He is oriented to person, place, and time. He appears well-developed and well-nourished. No distress.   HENT:   Right Ear: External ear normal.   Left Ear: External ear normal.   Nose: Nose normal.   Eyes: Pupils are equal, round, and reactive to light. Right eye exhibits no discharge. Left eye exhibits no discharge.   Neck: Normal range of motion.   Pulmonary/Chest: Effort normal and breath sounds normal. No respiratory distress.   Abdominal: Soft.   Neurological: He is alert and oriented to person, place, and time. He has normal reflexes. No cranial nerve deficit. He exhibits normal muscle tone. Coordination normal.   Psychiatric: He has a normal mood and affect. His behavior is normal. Judgment and thought content normal.             Right Hand/Wrist Exam     Inspection   Scars: Wrist - absent   Effusion: Wrist - absent   Bruising: Wrist - absent   Deformity: Wrist - deformity     Range of Motion     Wrist   Extension:  50 normal   Flexion:  70 normal   Abduction: 20 normal  Adduction: 35 normal    Tests   Phalens Sign: negative  Tinel's sign (median nerve): negative  Finkelstein's test: negative  Carpal Tunnel Compression Test: negative  Cubital Tunnel Compression Test: negative  LT Stability: negative  Caba's Test: negative      Other     Neuorologic Exam    Median Distribution: normal  Ulnar Distribution: normal  Radial Distribution: normal      Left Hand/Wrist Exam     Inspection   Scars: Wrist - absent   Effusion: Wrist - absent   Bruising: Wrist - absent   Deformity: Wrist - absent     Range of Motion     Wrist   Extension:  50 normal   Flexion:  70 normal   Abduction: 20 normal  Adduction: 35 normal    Tests   Phalens Sign: negative  Tinel's sign (median nerve): negative  Finkelstein's test: negative  Carpal Tunnel Compression Test: negative  Cubital Tunnel Compression Test: negative  LT Stability: negative  Caba's Test:  negative      Other     Sensory Exam  Median Distribution: normal  Ulnar Distribution: normal  Radial Distribution: normal      Right Elbow Exam     Inspection   Scars: present  Effusion: absent  Bruising: absent  Deformity: absent  Atrophy: absent    Pain   The patient exhibits pain of the medial epicondyle and ulnar collateral ligament    Range of Motion   Extension:  0 normal   Flexion:  130 normal   Pronation: normal Right elbow pronation: 80.   Supination:  90 normal     Tests   Varus: negative  Valgus: negative  Tinel's sign (cubital tunnel): negative  Tennis Elbow: negative  Golfer's Elbow: negative  Radial Capitellar Grind: negative    Other   Sensation: normal      Left Elbow Exam     Inspection   Scars: absent  Effusion: absent  Bruising: absent  Deformity: absent  Atrophy: absent    Range of Motion   Extension:  0 normal   Flexion:  130 normal   Pronation: normal   Supination:  80 normal     Tests   Varus: negative  Tinel's sign (cubital tunnel): negative  Tennis Elbow: negative  Golfer's Elbow: negative  Radial Capitellar Grind: negative    Other   Sensation: normal        Muscle Strength   Right Upper Extremity   Wrist extension: 5/5   Wrist flexion: 5/5   : 5/5   Pinch Mechanism: 5/5  Elbow Pronation:  5/5   Elbow Supination:  5/5   Elbow Extension: 5/5  Elbow Flexion: 4/5  Intrinsics: 5/5  EPL (Extensor Pollicis Longus): 5/5  Left Upper Extremity  Wrist extension: 5/5   Wrist flexion: 5/5   :  5/5   Pinch Mechanism: 5/5  Elbow Pronation:  5/5   Elbow Supination:  5/5   Elbow Extension: 5/5  Elbow Flexion: 5/5  Intrinsics: 5/5  EPL (Extensor Pollicis Longus): 5/5    Vascular Exam     Right Pulses      Radial:                    2+      Left Pulses      Radial:                    2+      Capillary Refill  Right Hand: normal capillary refill  Left Hand: normal capillary refill  Right Alberto's Test: no rapid refill no slow refill  Left Alberto's Test: no rapid refill no slow refill    Edema  Right  Forearm: absent  Left Forearm: absent              Assessment:       Encounter Diagnoses   Name Primary?    Elbow stiffness, right Yes    Muscle weakness of right upper extremity     Elbow pain, right     Ulnar collateral ligament sprain of right elbow, initial encounter           Plan:       1. Elbow Function Score, SF-12 was not filled out today in clinic.     4 weeks with Dr. Mary Ann Patton.  will fill out Elbow Function Score, and SF-12 R elbow XR.      2. Physical Therapy Ordered today- Eduin Padilla  75%  At 90 feet throwing program  Progress to full participation    3. Discussed Core, Glute strengthening program.                [facility] patient questionnaires have been collected today.

## 2020-10-07 ENCOUNTER — HOSPITAL ENCOUNTER (OUTPATIENT)
Dept: RADIOLOGY | Facility: HOSPITAL | Age: 20
Discharge: HOME | End: 2020-10-07
Attending: ORTHOPAEDIC SURGERY
Payer: COMMERCIAL

## 2020-10-07 ENCOUNTER — OFFICE VISIT (OUTPATIENT)
Dept: SPORTS MEDICINE | Facility: CLINIC | Age: 20
End: 2020-10-07
Payer: COMMERCIAL

## 2020-10-07 VITALS
SYSTOLIC BLOOD PRESSURE: 135 MMHG | HEIGHT: 73 IN | BODY MASS INDEX: 30.75 KG/M2 | HEART RATE: 75 BPM | WEIGHT: 232 LBS | DIASTOLIC BLOOD PRESSURE: 80 MMHG

## 2020-10-07 DIAGNOSIS — M25.521 ELBOW PAIN, RIGHT: ICD-10-CM

## 2020-10-07 DIAGNOSIS — S53.441A ULNAR COLLATERAL LIGAMENT SPRAIN OF RIGHT ELBOW, INITIAL ENCOUNTER: ICD-10-CM

## 2020-10-07 DIAGNOSIS — M62.81 MUSCLE WEAKNESS OF RIGHT UPPER EXTREMITY: ICD-10-CM

## 2020-10-07 DIAGNOSIS — M25.621 ELBOW STIFFNESS, RIGHT: ICD-10-CM

## 2020-10-07 DIAGNOSIS — M25.521 RIGHT ELBOW PAIN: Primary | ICD-10-CM

## 2020-10-07 DIAGNOSIS — M25.521 RIGHT ELBOW PAIN: ICD-10-CM

## 2020-10-07 PROCEDURE — 3008F PR BODY MASS INDEX (BMI) DOCUMENTED: ICD-10-PCS | Mod: CPTII,S$GLB,, | Performed by: ORTHOPAEDIC SURGERY

## 2020-10-07 PROCEDURE — 73080 X-RAY EXAM OF ELBOW: CPT | Mod: TC,RT

## 2020-10-07 PROCEDURE — 3008F BODY MASS INDEX DOCD: CPT | Mod: CPTII,S$GLB,, | Performed by: ORTHOPAEDIC SURGERY

## 2020-10-07 PROCEDURE — 73080 XR ELBOW COMPLETE 3 VIEW RIGHT: ICD-10-PCS | Mod: 26,RT,, | Performed by: RADIOLOGY

## 2020-10-07 PROCEDURE — 99214 PR OFFICE/OUTPT VISIT, EST, LEVL IV, 30-39 MIN: ICD-10-PCS | Mod: S$GLB,,, | Performed by: ORTHOPAEDIC SURGERY

## 2020-10-07 PROCEDURE — 99999 PR PBB SHADOW E&M-EST. PATIENT-LVL III: CPT | Mod: PBBFAC,,, | Performed by: ORTHOPAEDIC SURGERY

## 2020-10-07 PROCEDURE — 99999 PR PBB SHADOW E&M-EST. PATIENT-LVL III: ICD-10-PCS | Mod: PBBFAC,,, | Performed by: ORTHOPAEDIC SURGERY

## 2020-10-07 PROCEDURE — 99214 OFFICE O/P EST MOD 30 MIN: CPT | Mod: S$GLB,,, | Performed by: ORTHOPAEDIC SURGERY

## 2020-10-07 PROCEDURE — 73080 X-RAY EXAM OF ELBOW: CPT | Mod: 26,RT,, | Performed by: RADIOLOGY

## 2020-10-07 NOTE — PROGRESS NOTES
Subjective:          Chief Complaint: Oskar Lilly is a 20 y.o. male who had concerns including Pain of the Right Elbow.     Patient presents today with no complaints or concerns. He has minimal soreness when he extends his elbow after throwing. He says his pain has decreased greatly, however. He is currently in rehab and doing the core strengthening as well. He is not yet back to full sports participation.    He is taking Ibuprofen prn...    Interval hx:  Oskar Lilly is a  student.The pain started 1 year ago and is becoming progressively worse. Pain is located over (points to) medial right elbow. He reports that the pain is a 0 /10 at rest pain today and not responding adequately to conservative measures which have included activity modifications, rest, and oral medication. Is affecting ADLs and limiting desired level of activity. Denies numbness, tingling, radiation, and neck pain or radicular symptoms.  Pain is 0 /10 at its worst    Mechanical symptoms:  Subjective instability: (--)   Worse with increased throwing activities.  Better with rest.   Nocturnal symptoms: (--)    Right UCL reconstruction surgeries  on 10/21/2019- Dr. Jared Duran               Review of Systems   Constitution: Negative.   HENT: Negative.    Eyes: Negative.    Cardiovascular: Negative.    Respiratory: Negative.    Endocrine: Negative.    Hematologic/Lymphatic: Negative.    Skin: Negative.    Musculoskeletal: Positive for joint pain.   Gastrointestinal: Negative.    Genitourinary: Negative.    Neurological: Negative.    Psychiatric/Behavioral: Negative.    Allergic/Immunologic: Negative.    All other systems reviewed and are negative.      Pain Related Questions  Over the past 3 days, what was your average pain during activity? (I.e. running, jogging, walking, climbing stairs, getting dressed, ect.): 2  Over the past 3 days, what was your highest pain level?: 2  Over the past 3 days, what was your lowest pain level? : 2    Other  How many  nights a week are you awakened by your affected body part?: 0  Was the patient's HEIGHT measured or patient reported?: Patient Reported  Was the patient's WEIGHT measured or patient reported?: Measured      Objective:        General: Oskar is well-developed, well-nourished, appears stated age, in no acute distress, alert and oriented to time, place and person.     General    Vitals reviewed.  Constitutional: He is oriented to person, place, and time. He appears well-developed and well-nourished. No distress.   HENT:   Right Ear: External ear normal.   Left Ear: External ear normal.   Nose: Nose normal.   Eyes: Pupils are equal, round, and reactive to light. Right eye exhibits no discharge. Left eye exhibits no discharge.   Neck: Normal range of motion.   Pulmonary/Chest: Effort normal and breath sounds normal. No respiratory distress.   Abdominal: Soft.   Neurological: He is alert and oriented to person, place, and time. He has normal reflexes. No cranial nerve deficit. He exhibits normal muscle tone. Coordination normal.   Psychiatric: He has a normal mood and affect. His behavior is normal. Judgment and thought content normal.             Right Hand/Wrist Exam     Inspection   Scars: Wrist - absent   Effusion: Wrist - absent   Bruising: Wrist - absent   Deformity: Wrist - deformity     Range of Motion     Wrist   Extension:  50 normal   Flexion:  70 normal   Abduction: 20 normal  Adduction: 35 normal    Tests   Phalens Sign: negative  Tinel's sign (median nerve): negative  Finkelstein's test: negative  Carpal Tunnel Compression Test: negative  Cubital Tunnel Compression Test: negative  LT Stability: negative  Caba's Test: negative      Other     Neuorologic Exam    Median Distribution: normal  Ulnar Distribution: normal  Radial Distribution: normal      Left Hand/Wrist Exam     Inspection   Scars: Wrist - absent   Effusion: Wrist - absent   Bruising: Wrist - absent   Deformity: Wrist - absent     Range of Motion      Wrist   Extension:  50 normal   Flexion:  70 normal   Abduction: 20 normal  Adduction: 35 normal    Tests   Phalens Sign: negative  Tinel's sign (median nerve): negative  Finkelstein's test: negative  Carpal Tunnel Compression Test: negative  Cubital Tunnel Compression Test: negative  LT Stability: negative  Caba's Test: negative      Other     Sensory Exam  Median Distribution: normal  Ulnar Distribution: normal  Radial Distribution: normal      Right Elbow Exam     Inspection   Scars: present  Effusion: absent  Bruising: absent  Deformity: absent  Atrophy: absent    Pain   The patient exhibits pain of the medial epicondyle and ulnar collateral ligament    Range of Motion   Extension:  0 normal   Flexion:  130 normal   Pronation: normal Right elbow pronation: 80.   Supination:  90 normal     Tests   Varus: negative  Valgus: negative  Tinel's sign (cubital tunnel): negative  Tennis Elbow: negative  Golfer's Elbow: negative  Radial Capitellar Grind: negative    Other   Sensation: normal      Left Elbow Exam     Inspection   Scars: absent  Effusion: absent  Bruising: absent  Deformity: absent  Atrophy: absent    Range of Motion   Extension:  0 normal   Flexion:  130 normal   Pronation: normal   Supination:  80 normal     Tests   Varus: negative  Tinel's sign (cubital tunnel): negative  Tennis Elbow: negative  Golfer's Elbow: negative  Radial Capitellar Grind: negative    Other   Sensation: normal        Muscle Strength   Right Upper Extremity   Wrist extension: 5/5   Wrist flexion: 5/5   : 5/5   Pinch Mechanism: 5/5  Elbow Pronation:  5/5   Elbow Supination:  5/5   Elbow Extension: 5/5  Elbow Flexion: 4/5  Intrinsics: 5/5  EPL (Extensor Pollicis Longus): 5/5  Left Upper Extremity  Wrist extension: 5/5   Wrist flexion: 5/5   :  5/5   Pinch Mechanism: 5/5  Elbow Pronation:  5/5   Elbow Supination:  5/5   Elbow Extension: 5/5  Elbow Flexion: 5/5  Intrinsics: 5/5  EPL (Extensor Pollicis Longus):  5/5    Vascular Exam     Right Pulses      Radial:                    2+      Left Pulses      Radial:                    2+      Capillary Refill  Right Hand: normal capillary refill  Left Hand: normal capillary refill  Right Alberto's Test: no rapid refill no slow refill  Left Alberto's Test: no rapid refill no slow refill    Edema  Right Forearm: absent  Left Forearm: absent    XR Imaging (10-7-20):  Narrative & Impression     EXAMINATION:  XR ELBOW COMPLETE 3 VIEW RIGHT     CLINICAL HISTORY:  . Pain in right elbow.  History of right ulna collateral ligament reconstruction surgery October 21, 2019     TECHNIQUE:  AP, lateral, and oblique views of the right elbow were performed.     COMPARISON:  September 18, 2018 elbow image, MRI elbow September 19, 2018.     FINDINGS:  No fracture.  No malalignment.  No joint effusion.  In the setting of a stable 3 x 5 mm soft tissue ossicle adjacent to the medial epicondyle, interval development of some additional smaller soft tissue calcifications ar/or ossification foci about the medial epicondyle with an 4 x 6 mm area of medial epicondyle cortical irregularity and lucency/erosion/bone loss.  These intervally developed findings could relate to above-mentioned postsurgical changes(correlate with surgical note) but if any concern findings might be related to erosive or inflammatory/infectious pathology should further evaluate with MRI.     Impression:     As above.             Assessment:       Encounter Diagnoses   Name Primary?    Right elbow pain Yes    Ulnar collateral ligament sprain of right elbow, initial encounter     Muscle weakness of right upper extremity     Elbow stiffness, right     Elbow pain, right           Plan:       1.  Elbow Function Score, SF-12 was not filled out today in clinic.     RTC in 3 months with Dr. Mary Ann Patton. Patient will fill out Elbow Function Score, and SF-12. No elbow XR.    2. Physical Therapy - Eduin Padilla  Work towards 100% function &  full participation    3. Continue core program                [facility] patient questionnaires have been collected today.

## 2020-10-07 NOTE — LETTER
Patient: Oskar Lilly   YOB: 2000   Clinic Number: [id number]   Today's Date: October 7, 2020        Certificate to Return to Sport/PE     Oskar Alba was seen by Mary Ann Patton MD on 10/7/2020.    Follow up in about 3 months Dr. Mary Ann Patton.    Patient to progress towards 100% as tolerated and released for full activity.     Comments:     If you have any questions or concerns, please feel free to contact the office at [phone].    Thank you.    Mary Ann Patton MD        Signature: __________________________________________________

## 2020-10-21 NOTE — PROGRESS NOTES
Chief Complaint: Oskar Lilly is a 20 y.o. male who had concerns including Pain of the Right Elbow.   Patient presents today with no complaints or concerns. He has minimal soreness when he extends his elbow after throwing. He says his pain has decreased greatly, however. He is currently in rehab and doing the core strengthening as well. He is not yet back to full sports participation.   He is taking Ibuprofen prn...   Interval hx:   Oskar Lilly is a student.The pain started 1 year ago and is becoming progressively worse. Pain is located over (points to) medial right elbow. He reports that the pain is a 0 /10 at rest pain today and not responding adequately to conservative measures which have included activity modifications, rest, and oral medication. Is affecting ADLs and limiting desired level of activity. Denies numbness, tingling, radiation, and neck pain or radicular symptoms.   Pain is 0 /10 at its worst   Mechanical symptoms:   Subjective instability: (--)   Worse with increased throwing activities.   Better with rest.   Nocturnal symptoms: (--)   Right UCL reconstruction surgeries on 10/21/2019- Dr. Jared Duran     Review of Systems   Constitution: Negative.   HENT: Negative.   Eyes: Negative.   Cardiovascular: Negative.   Respiratory: Negative.   Endocrine: Negative.   Hematologic/Lymphatic: Negative.   Skin: Negative.   Musculoskeletal: Positive for joint pain.   Gastrointestinal: Negative.   Genitourinary: Negative.   Neurological: Negative.   Psychiatric/Behavioral: Negative.   Allergic/Immunologic: Negative.   All other systems reviewed and are negative.     Pain Related Questions   Over the past 3 days, what was your average pain during activity? (I.e. running, jogging, walking, climbing stairs, getting dressed, ect.): 2   Over the past 3 days, what was your highest pain level?: 2   Over the past 3 days, what was your lowest pain level? : 2   Other   How many nights a week are you awakened by your  affected body part?: 0   Was the patient's HEIGHT measured or patient reported?: Patient Reported   Was the patient's WEIGHT measured or patient reported?: Measured   Objective:   General: Oskar is well-developed, well-nourished, appears stated age, in no acute distress, alert and oriented to time, place and person.   General   Vitals reviewed.   Constitutional: He is oriented to person, place, and time. He appears well-developed and well-nourished. No distress.   HENT:   Right Ear: External ear normal.   Left Ear: External ear normal.   Nose: Nose normal.   Eyes: Pupils are equal, round, and reactive to light. Right eye exhibits no discharge. Left eye exhibits no discharge.   Neck: Normal range of motion.   Pulmonary/Chest: Effort normal and breath sounds normal. No respiratory distress.   Abdominal: Soft.   Neurological: He is alert and oriented to person, place, and time. He has normal reflexes. No cranial nerve deficit. He exhibits normal muscle tone. Coordination normal.   Psychiatric: He has a normal mood and affect. His behavior is normal. Judgment and thought content normal.   Right Hand/Wrist Exam   Inspection   Scars: Wrist - absent   Effusion: Wrist - absent   Bruising: Wrist - absent   Deformity: Wrist - deformity   Range of Motion   Wrist   Extension: 50 normal   Flexion: 70 normal   Abduction: 20 normal   Adduction: 35 normal   Tests   Phalens Sign: negative  Tinel's sign (median nerve): negative   Finkelstein's test: negative  Carpal Tunnel Compression Test: negative  Cubital Tunnel Compression Test: negative  LT Stability: negative  Caba's Test: negative   Other   Neuorologic Exam     Median Distribution: normal  Ulnar Distribution: normal  Radial Distribution: normal   Left Hand/Wrist Exam   Inspection   Scars: Wrist - absent   Effusion: Wrist - absent   Bruising: Wrist - absent   Deformity: Wrist - absent   Range of Motion   Wrist   Extension: 50 normal   Flexion: 70 normal   Abduction: 20 normal    Adduction: 35 normal   Tests   Phalens Sign: negative  Tinel's sign (median nerve): negative   Finkelstein's test: negative  Carpal Tunnel Compression Test: negative  Cubital Tunnel Compression Test: negative  LT Stability: negative  Caba's Test: negative   Other   Sensory Exam  Median Distribution: normal  Ulnar Distribution: normal  Radial Distribution: normal   Right Elbow Exam   Inspection   Scars: present  Effusion: absent  Bruising: absent  Deformity: absent  Atrophy: absent   Pain   The patient exhibits pain of the medial epicondyle and ulnar collateral ligament   Range of Motion   Extension: 0 normal   Flexion: 130 normal   Pronation: normal Right elbow pronation: 80.   Supination: 90 normal   Tests   Varus: negative   Valgus: negative   Tinel's sign (cubital tunnel): negative   Tennis Elbow: negative   Golfer's Elbow: negative   Radial Capitellar Grind: negative   Other   Sensation: normal   Left Elbow Exam   Inspection   Scars: absent  Effusion: absent  Bruising: absent  Deformity: absent  Atrophy: absent   Range of Motion   Extension: 0 normal   Flexion: 130 normal   Pronation: normal   Supination: 80 normal   Tests   Varus: negative   Tinel's sign (cubital tunnel): negative   Tennis Elbow: negative   Golfer's Elbow: negative   Radial Capitellar Grind: negative   Other   Sensation: normal   Muscle Strength   Right Upper Extremity   Wrist extension: 5/5   Wrist flexion: 5/5   : 5/5   Pinch Mechanism: 5/5   Elbow Pronation: 5/5   Elbow Supination: 5/5   Elbow Extension: 5/5   Elbow Flexion: 4/5   Intrinsics: 5/5   EPL (Extensor Pollicis Longus): 5/5   Left Upper Extremity   Wrist extension: 5/5   Wrist flexion: 5/5   : 5/5   Pinch Mechanism: 5/5   Elbow Pronation: 5/5   Elbow Supination: 5/5   Elbow Extension: 5/5   Elbow Flexion: 5/5   Intrinsics: 5/5   EPL (Extensor Pollicis Longus): 5/5   Vascular Exam   Right Pulses   Radial: 2+   Left Pulses   Radial: 2+   Capillary Refill   Right Hand:  normal capillary refill   Left Hand: normal capillary refill   Right Alberto's Test: no rapid refill no slow refill   Left Alberto's Test: no rapid refill no slow refill   Edema  Right Forearm: absent  Left Forearm: absent     XR Imaging (10-7-20):   Narrative & Impression     EXAMINATION:   XR ELBOW COMPLETE 3 VIEW RIGHT   CLINICAL HISTORY:   . Pain in right elbow. History of right ulna collateral ligament reconstruction surgery October 21, 2019   TECHNIQUE:   AP, lateral, and oblique views of the right elbow were performed.   COMPARISON:   September 18, 2018 elbow image, MRI elbow September 19, 2018.   FINDINGS:   No fracture. No malalignment. No joint effusion. In the setting of a stable 3 x 5 mm soft tissue ossicle adjacent to the medial epicondyle, interval development of some additional smaller soft tissue calcifications ar/or ossification foci about the medial epicondyle with an 4 x 6 mm area of medial epicondyle cortical irregularity and lucency/erosion/bone loss. These intervally developed findings could relate to above-mentioned postsurgical changes(correlate with surgical note) but if any concern findings might be related to erosive or inflammatory/infectious pathology should further evaluate with MRI.   Impression:   As above.     Assessment:          Encounter Diagnoses   Name Primary?    Right elbow pain Yes    Ulnar collateral ligament sprain of right elbow, initial encounter     Muscle weakness of right upper extremity     Elbow stiffness, right     Elbow pain, right      Plan:   1. Elbow Function Score, SF-12 was not filled out today in clinic.   RTC in 3 months with Dr. Mary Ann Patton. Patient will fill out Elbow Function Score, and SF-12. No elbow XR.   2. Physical Therapy - Eduin Padilla   Work towards 100% function & full participation   3. Continue core program

## 2020-12-31 ENCOUNTER — CLINICAL SUPPORT (OUTPATIENT)
Dept: URGENT CARE | Facility: CLINIC | Age: 20
End: 2020-12-31
Payer: COMMERCIAL

## 2020-12-31 DIAGNOSIS — Z03.818 ENCOUNTER FOR OBSERVATION FOR SUSPECTED EXPOSURE TO OTHER BIOLOGICAL AGENTS RULED OUT: Primary | ICD-10-CM

## 2020-12-31 LAB
CTP QC/QA: YES
SARS-COV-2 RDRP RESP QL NAA+PROBE: NEGATIVE

## 2020-12-31 PROCEDURE — U0002 COVID-19 LAB TEST NON-CDC: HCPCS | Mod: QW,S$GLB,, | Performed by: NURSE PRACTITIONER

## 2020-12-31 PROCEDURE — U0002: ICD-10-PCS | Mod: QW,S$GLB,, | Performed by: NURSE PRACTITIONER

## 2021-01-05 ENCOUNTER — CLINICAL SUPPORT (OUTPATIENT)
Dept: URGENT CARE | Facility: CLINIC | Age: 21
End: 2021-01-05
Payer: COMMERCIAL

## 2021-01-05 DIAGNOSIS — Z11.59 SCREENING FOR VIRAL DISEASE: Primary | ICD-10-CM

## 2021-01-05 LAB
CTP QC/QA: YES
SARS-COV-2 RDRP RESP QL NAA+PROBE: NEGATIVE

## 2021-01-05 PROCEDURE — U0002 COVID-19 LAB TEST NON-CDC: HCPCS | Mod: QW,S$GLB,, | Performed by: PHYSICIAN ASSISTANT

## 2021-01-05 PROCEDURE — U0002: ICD-10-PCS | Mod: QW,S$GLB,, | Performed by: PHYSICIAN ASSISTANT

## 2021-10-21 ENCOUNTER — ATHLETIC TRAINING SESSION (OUTPATIENT)
Dept: SPORTS MEDICINE | Facility: CLINIC | Age: 21
End: 2021-10-21
Payer: COMMERCIAL

## 2021-10-26 ENCOUNTER — ATHLETIC TRAINING SESSION (OUTPATIENT)
Dept: SPORTS MEDICINE | Facility: CLINIC | Age: 21
End: 2021-10-26
Payer: COMMERCIAL

## 2021-11-08 ENCOUNTER — ATHLETIC TRAINING SESSION (OUTPATIENT)
Dept: SPORTS MEDICINE | Facility: CLINIC | Age: 21
End: 2021-11-08
Payer: COMMERCIAL

## 2021-11-11 ENCOUNTER — ATHLETIC TRAINING SESSION (OUTPATIENT)
Dept: SPORTS MEDICINE | Facility: CLINIC | Age: 21
End: 2021-11-11

## 2021-11-16 ENCOUNTER — ATHLETIC TRAINING SESSION (OUTPATIENT)
Dept: SPORTS MEDICINE | Facility: CLINIC | Age: 21
End: 2021-11-16
Payer: COMMERCIAL

## 2021-11-29 ENCOUNTER — ATHLETIC TRAINING SESSION (OUTPATIENT)
Dept: SPORTS MEDICINE | Facility: CLINIC | Age: 21
End: 2021-11-29
Payer: COMMERCIAL

## 2021-12-13 ENCOUNTER — ATHLETIC TRAINING SESSION (OUTPATIENT)
Dept: SPORTS MEDICINE | Facility: CLINIC | Age: 21
End: 2021-12-13
Payer: COMMERCIAL

## 2022-01-03 ENCOUNTER — ATHLETIC TRAINING SESSION (OUTPATIENT)
Dept: SPORTS MEDICINE | Facility: CLINIC | Age: 22
End: 2022-01-03

## 2022-02-08 ENCOUNTER — ATHLETIC TRAINING SESSION (OUTPATIENT)
Dept: SPORTS MEDICINE | Facility: CLINIC | Age: 22
End: 2022-02-08
Payer: COMMERCIAL

## 2022-02-08 NOTE — PROGRESS NOTES
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 He has a Hx of UCL tear that was repaired ~ 3 years ago, but he claims he did not go through proper rehab because of COVID-19. He has previously reported occasional tightness in the area that has been managed well with soft tissue work, and stretching. He reports on 2/7 before the game against [employer] that the pain is sharp in the medial elbow when he throws. He reports that it feels similar to how it did when he first was injured.         Objective     Mild swelling in the medial elbow area, no bruising or deformity. POP over medial elbow. Valgus stress test and moving valgus stress test shows pain but no laxity in the joint.       Daily Treatment     We have tried fascial cupping in the area which typically relieves pain. Patient has not reported for treatment in over a week at this point which likely adds to his pain.        Plan     Patient denies wanting to be referred to a doctor for further evaluation. He reports that he has a good chance to play and doesn't want to ruin it. He reports he pain isn't bad enough to play through, it is just there. ATC advised the patient that If he has pain in the area and is worried about it, he should be referred for further evaluation and possible imaging. Patient again denied wanting to do that. Patient agreed to treatment and if symptoms don't resolve or worsen, he will be referred.      Mark ISRAEL, DELLA, ATC, CAROL    [employer] [facility]

## 2022-02-14 ENCOUNTER — ATHLETIC TRAINING SESSION (OUTPATIENT)
Dept: SPORTS MEDICINE | Facility: CLINIC | Age: 22
End: 2022-02-14
Payer: COMMERCIAL

## 2022-02-16 NOTE — PROGRESS NOTES
"CC: RIGHT elbow pain     18 y.o. Male presents who presents today with continued medially based elbow pain.  Previous exam and imaging demonstrated a low-grade partial tear of the UCL from the humeral insertion with evidence of chronicity.  He also was noted previously to have posterior capsular tightness of the shoulder with findings suggestive of internal impingement. 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  He has met with Eduin Padilla since our last visit but according to the therapist and the player has not been consistent with his rehab program.  He is from 2 times since restarted his rehabilitation and has had pain both times with throwing.  Continues to localize over the UCL.  Threw no more than 60 ft on both occasions.  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   Negative for diabetes.    Pain Score:   8    PAST MEDICAL HISTORY:   History reviewed. No pertinent past medical history.    PAST SURGICAL HISTORY:  History reviewed. No pertinent surgical history.    FAMILY HISTORY:  History reviewed. No pertinent family history.    MEDICATIONS:  No current outpatient medications on file.    ALLERGIES:  Review of patient's allergies indicates:  No Known Allergies     REVIEW OF SYSTEMS:  Constitution: Negative. Negative for chills, fever and night sweats.    Hematologic/Lymphatic: Negative for bleeding problem. Does not bruise/bleed easily.   Skin: Negative for dry skin, itching and rash.   Musculoskeletal: Negative for falls. Positive for right elbow pain and  muscle weakness.     PHYSICAL EXAMINATION:  Vitals:  BP (!) 152/82   Pulse (!) 56   Ht 6' 1" (1.854 m)   Wt 90.7 kg (200 lb)   BMI 26.39 kg/m²    General: Well-developed well-nourished 18 y.o. malein no acute distress   Cardiovascular: Regular rhythm by palpation of distal pulse, normal color and temperature, no concerning varicosities on symptomatic side   Lungs: No " Chief Complaint/Reason for Visit:   Chief Complaint   Patient presents with   • ER F/U     SOTO HSP Emrgncy Rm F/uP oN ABN Stress Test      HISTORY OF PRESENT ILLNESS: Abrahan Wallace Sr. is a 68 year old male with a past medical history of CVA, carotid artery disease, hypertension, hyperlipidemia, obstructive sleep apnea, and obesity.  He was recently hospitalized (1/14/22-1/19/22) at [facility] with COVID-19 infection.  He also had new atrial fibrillation with rapid ventricular response.  Troponin was elevated.  He was treated for acute heart failure with preserved ejection fraction.  He underwent stress testing which was abnormal and was recommended to follow-up for an outpatient cardiac catheterization.    He presents to the office today for hospital follow-up.  He is accompanied by his daughter.  He denies chest pain, shortness of breath, dizziness, palpitations, orthopnea, or lower extremity edema.    REVIEW OF SYSTEMS:  Review of Systems   Eyes: Negative.    Cardiovascular: Negative for chest pain, dyspnea on exertion, leg swelling and palpitations.   Respiratory: Positive for sleep disturbances due to breathing. Negative for cough and shortness of breath.    Skin: Negative.    Musculoskeletal: Negative.    Gastrointestinal: Negative.    Genitourinary: Negative.    Neurological: Negative for dizziness, headaches and light-headedness.   Psychiatric/Behavioral: Negative.        PAST MEDICAL HISTORY:   Past Medical History:   Diagnosis Date   • Bilateral carotid artery stenosis 7/15/2020   • Essential (primary) hypertension 7/15/2020   • Hypercholesterolemia 7/15/2020   • Sleep apnea    • Sleep disorder breathing 7/15/2020   • Stroke (CMS/[facility])      PAST SURGICAL HISTORY:   No past surgical history on file.    FAMILY HISTORY:   Family History   Problem Relation Age of Onset   • Patient is unaware of any medical problems Mother    • COPD Father      SOCIAL HISTORY:   Social History     Tobacco Use   • Smoking status:  Never Smoker   • Smokeless tobacco: Never Used   Vaping Use   • Vaping Use: never used   Substance Use Topics   • Alcohol use: Yes     Comment: Socially   • Drug use: Never       Drug Use:    Never           ALLERGIES:   ALLERGIES:  No Known Allergies    MEDICATIONS:   Current Outpatient Medications   Medication Sig Dispense Refill   • albuterol (PROAIR RESPICLICK) 108 (90 Base) MCG/ACT inhaler 90 puffs.     • carvedilol (COREG) 25 MG tablet Take 1 tablet by mouth 2 times daily (with meals). 60 tablet 3   • furosemide (LASIX) 40 MG tablet Take 1 tablet by mouth daily. Do not start before January 20, 2022. 30 tablet 0   • apixaBAN (Eliquis) 5 MG Tab Take 1 tablet by mouth every 12 hours. 60 tablet 3   • lisinopril (ZESTRIL) 30 MG tablet TAKE 1 TABLET BY MOUTH DAILY 90 tablet 3   • atorvastatin (LIPITOR) 80 MG tablet TAKE 1 TABLET BY MOUTH EVERY DAY 90 tablet 1   • aspirin (ECOTRIN) 81 MG EC tablet Take 81 mg by mouth daily.       No current facility-administered medications for this visit.     PHYSICAL  EXAMINATION  Visit Vitals  BP (!) 140/78 (BP Location: LUE - Left upper extremity, Patient Position: Sitting)   Pulse 96   Ht 5' 9\" (1.753 m)   Wt (!) 143.3 kg (316 lb)   SpO2 91%   BMI 46.67 kg/m²     Physical Exam  Vitals reviewed.   Constitutional:       General: He is not in acute distress.     Appearance: Normal appearance. He is well-developed. He is obese. He is not diaphoretic.   HENT:      Head: Normocephalic and atraumatic.   Eyes:      Conjunctiva/sclera: Conjunctivae normal.   Neck:      Vascular: No carotid bruit or JVD.   Cardiovascular:      Rate and Rhythm: Normal rate and regular rhythm.      Pulses: Normal pulses.      Heart sounds: S1 normal and S2 normal. No murmur heard.  Pulmonary:      Effort: Pulmonary effort is normal. No respiratory distress.      Breath sounds: Normal breath sounds. No wheezing or rales.   Chest:      Chest wall: No tenderness.   Abdominal:      General: There is no  labored breathing or wheezing appreciated   Neuro: Alert and oriented ×3   Psychiatric: well oriented to person, place and time, demonstrates normal mood and affect   Skin: No rashes, lesions or ulcers, normal temperature, turgor, and texture on uninvolved extremity    Ortho/SPM Exam  Examination of the right elbow demonstrates full extension and flexion. Point tenderness over the proximal UCL insertion.  Minimal tenderness over the sublime tubercle. Mild medial epicondyle specific tenderness as well. No pain with resisted wrist flexion. Normal stability to varus and valgus stress but pain present on valgus stress testing.  Positive milk test for pain over the UCL.  Positive valgus shear test at 60 degrees of flexion again over the UCL.  No instability appreciated.  Negative Tinel over the ulnar nerve. Shoulder passive ER to 110, IR to 30 on the right. Passive ER to 100, IR to 60 on the left.  Total arc of motion loss of 20°.  Fairly good core and lower extremity strength. No scapular dyskinesis or winging.  Intact rotator cuff strength. No tenderness over the biceps.    IMAGING:  Xrays including AP/Lateral/Radial Capitellar of R Elbow views are ordered / images reviewed by me:   Small ossific fragment in the region of the proximal UCL suggestive of chronic injury    MRI of R elbow reviewed by me and discussed with patient. Images show:   1. Suspected partial tear of the proximal UCL, likely chronic or acute on chronic.  Consider further evaluation with MRI arthrogram.   2. Mild myotendinous edema of the common flexor tendon.    Agree with acute on chronic partial tear of the UCL proximally.    ASSESSMENT:      ICD-10-CM ICD-9-CM   1. Elbow sprain, ulnar collateral ligament, right, subsequent encounter S53.441D V58.89     841.1   2. Right elbow pain M25.521 719.42     Notable continue posterior capsular tightness at the right shoulder    PLAN:     I had a long discussion with both the patient his father via  breanna and Eduin Padilla today. This is an elbow injury expect that he should be able to rehab through. Return to same level play rates are better with non operative treatment of this injury.  With that said, he has made minimal improvement since our last visit.  I believe this is due to some extent to suboptimal rehab participation.  I have discussed the importance of this with the player.  In particular he needs to work on posterior capsular stretching to improve his overall total arc of motion at the shoulder.  We also need to get the player in our gym for a throwing session to assess mechanics.  He has not followed up on this quite yet.  New plan is to get this done this week and to assess how much pain he is having with throwing.  If needed we will restart the clock and shut things down for another month to focus on stretching and functional strengthening.  Consider a PRP injection this week as well. Goal would be to start throwing again in about 6 weeks with an interval program which would ramp up full for release around mid February.  Otherwise if he fails to make progress we may need to consider UCL reconstruction and a redshirt.  He will discuss this with his family and let us know how he would like to proceed I will hold off on discussing with his  until I hear back from them.    Procedures       distension.      Palpations: Abdomen is soft.   Musculoskeletal:         General: Normal range of motion.      Cervical back: Normal range of motion.   Skin:     General: Skin is warm and dry.   Neurological:      Mental Status: He is alert and oriented to person, place, and time.   Psychiatric:         Mood and Affect: Mood normal.         Behavior: Behavior normal.       Diagnostic Testing:     LEXISCAN 01/18/2022  Summary:     1. Myocardial perfusion imaging: Left ventricular size is normal. There is no transient ischemic dilation of the left ventricle during stress. There is a large, moderately severe, reversible defect involving the mid and apical anterior and apical wall(s). There is a medium-sized, moderately severe, reversible defect involving the basal and mid inferior wall(s), consistent with ischemia.  2. Gated SPECT: The calculated left ventricular ejection fraction during stress is 74%. No diagnostic evidence for left ventricular regional abnormality.  3. Stress ECG conclusions: Rare isolated ventricular ectopy. The stress ECG is negative for ischemia. The sensitivity of this test is limited by baseline right bundle branch block.  4. Baseline ECG: Sinus bradycardia with right bundle branch block. There was an old septal myocardial infarction.  Impressions:  There is evidence of myocardial ischemia.      TTE 01/16/2022  STUDY CONCLUSIONS  SUMMARY:   Left ventricle: The cavity size is normal. Wall thickness is mildly increased. The ejection fraction was measured by biplane method of disks. The ejection fraction is 69%.      EVENT MONITOR 04/09/2020  IMPRESSION:  Outpatient event monitor demonstrating sinus rhythm. No atrial or ventricular arrhythmias were detected. Clinical correlation is recommended.    Labs:    Recent Labs   Lab 12/03/21  0909   Cholesterol 126   HDL 36*   Triglycerides 130   CALCLDL 64   Non-HDL Cholesterol 90     Recent Labs   Lab 01/19/22  0547 01/16/22  0534 01/15/22  0503   Sodium  140   < > 138   Chloride 107   < > 107   BUN 32*   < > 31*   BUN/ Creatinine Ratio 27*   < > 20   Potassium 3.9   < > 4.2   Glucose 102*   < > 138*   Creatinine 1.19*   < > 1.53*   Calcium 8.5   < > 8.1*   TSH  --   --  0.695    < > = values in this interval not displayed.     IMPRESSION/PLAN:    1. Hospital discharge follow-up  -Admitted with Covid infection, A. fib with RVR, elevated troponin, acute HFpEF    2. Atrial fibrillation, new onset (CMS/HCC)  -New onset atrial fibrillation  -[id number] score is 4  -Initiated on anticoagulation with Eliquis 5 mg twice daily, tolerating  -On beta-blocker for rate control  -Currently regular rhythm    3. Essential (primary) hypertension  -Blood pressure is elevated, 140/78 in the office today  -Diuretics were discontinued due to worsening creatinine  -On lisinopril 30 mg daily  -Increase carvedilol to 25 mg twice daily    4. Hypercholesterolemia  -Lipid panel reviewed  -Continue statin therapy    5. Bilateral carotid artery stenosis  -Stable carotid ultrasound 8/31/2021  -Repeat ultrasound in 1 year  -Continue ASA and statin     6. Chronic diastolic congestive heart failure (CMS/HCC)  -Normal LV systolic function  -Continue furosemide    7. Abnormal stress test  -Troponin was elevated likely type II MI  -Lexiscan showed evidence of reversible ischemia  -Plan for outpatient cardiac catheterization  -The risks of the procedure include, but are not limited to, the following: bleeding that may require transfusion, vascular injury requiring surgical repair, contrast nephropathy, limb and organ ischemia, myocardial infarction, stroke, or death. These risks were discussed with the patient and daughter who verbalized understanding and agreed to proceed.   -Continue aspirin, statin, beta-blocker  -No current anginal symptoms    8. Obstructive sleep apnea syndrome  -On CPAP    9. History of CVA  -Right frontal stroke 2020  -30 day event monitor unremarkable  -Continue ASA,  statin     2/16/2022    Return for APN 1-2 weeks post procedure, Dr. Jimenez in 2 months.    Frida Elam CNP   [facility]

## 2022-02-16 NOTE — PROGRESS NOTES
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 He reports that he still feels it, but so far, it is getting a little better. He reports his worst days are on days the throws more than normal, specifically on heavy bullpen days and days he practices throw downs to second.      Objective     Some very mild swelling still present, over medial elbow.        Daily Treatment     Fascial cupping applied prior to activity, some pain relief felt after.        Plan     Plan to continue to treat and monitor. Patient often comes into ATF and practice with a very negative attitude about his role on the team. ATC informed athlete that looking into ways to change is outlook and attitude may also improve his pain to a degree. Patient reports that he will look into this.      Mark ISRAEL, LAT, ATC, CAROL    [employer] [place] [facility]

## 2022-02-18 ENCOUNTER — HOSPITAL ENCOUNTER (OUTPATIENT)
Dept: RADIOLOGY | Facility: HOSPITAL | Age: 22
Discharge: HOME | End: 2022-02-18
Attending: ORTHOPAEDIC SURGERY
Payer: COMMERCIAL

## 2022-02-18 ENCOUNTER — OFFICE VISIT (OUTPATIENT)
Dept: SPORTS MEDICINE | Facility: CLINIC | Age: 22
End: 2022-02-18
Payer: COMMERCIAL

## 2022-02-18 VITALS
HEIGHT: 73 IN | HEART RATE: 61 BPM | DIASTOLIC BLOOD PRESSURE: 55 MMHG | WEIGHT: 232 LBS | BODY MASS INDEX: 30.75 KG/M2 | SYSTOLIC BLOOD PRESSURE: 132 MMHG

## 2022-02-18 DIAGNOSIS — M25.521 RIGHT ELBOW PAIN: ICD-10-CM

## 2022-02-18 DIAGNOSIS — M25.521 RIGHT ELBOW PAIN: Primary | ICD-10-CM

## 2022-02-18 DIAGNOSIS — Z98.890 HISTORY OF ARTHROSCOPIC SURGERY OF ELBOW: ICD-10-CM

## 2022-02-18 PROCEDURE — 99999 PR PBB SHADOW E&M-EST. PATIENT-LVL IV: CPT | Mod: PBBFAC,,, | Performed by: ORTHOPAEDIC SURGERY

## 2022-02-18 PROCEDURE — 1159F PR MEDICATION LIST DOCUMENTED IN MEDICAL RECORD: ICD-10-PCS | Mod: CPTII,S$GLB,, | Performed by: ORTHOPAEDIC SURGERY

## 2022-02-18 PROCEDURE — 1160F RVW MEDS BY RX/DR IN RCRD: CPT | Mod: CPTII,S$GLB,, | Performed by: ORTHOPAEDIC SURGERY

## 2022-02-18 PROCEDURE — 76882 US LMTD JT/FCL EVL NVASC XTR: CPT | Mod: 26,S$GLB,, | Performed by: ORTHOPAEDIC SURGERY

## 2022-02-18 PROCEDURE — 73080 X-RAY EXAM OF ELBOW: CPT | Mod: TC,RT

## 2022-02-18 PROCEDURE — 1159F MED LIST DOCD IN RCRD: CPT | Mod: CPTII,S$GLB,, | Performed by: ORTHOPAEDIC SURGERY

## 2022-02-18 PROCEDURE — 73080 X-RAY EXAM OF ELBOW: CPT | Mod: 26,RT,, | Performed by: RADIOLOGY

## 2022-02-18 PROCEDURE — 99214 OFFICE O/P EST MOD 30 MIN: CPT | Mod: 25,S$GLB,, | Performed by: ORTHOPAEDIC SURGERY

## 2022-02-18 PROCEDURE — 73080 XR ELBOW COMPLETE 3 VIEW RIGHT: ICD-10-PCS | Mod: 26,RT,, | Performed by: RADIOLOGY

## 2022-02-18 PROCEDURE — 76882 PR  US,EXTREMITY,NONVASCULAR,REAL-TIME IMAGE,LIMITED: ICD-10-PCS | Mod: 26,S$GLB,, | Performed by: ORTHOPAEDIC SURGERY

## 2022-02-18 PROCEDURE — 3078F DIAST BP <80 MM HG: CPT | Mod: CPTII,S$GLB,, | Performed by: ORTHOPAEDIC SURGERY

## 2022-02-18 PROCEDURE — 3075F PR MOST RECENT SYSTOLIC BLOOD PRESS GE 130-139MM HG: ICD-10-PCS | Mod: CPTII,S$GLB,, | Performed by: ORTHOPAEDIC SURGERY

## 2022-02-18 PROCEDURE — 99999 PR PBB SHADOW E&M-EST. PATIENT-LVL IV: ICD-10-PCS | Mod: PBBFAC,,, | Performed by: ORTHOPAEDIC SURGERY

## 2022-02-18 PROCEDURE — 3008F PR BODY MASS INDEX (BMI) DOCUMENTED: ICD-10-PCS | Mod: CPTII,S$GLB,, | Performed by: ORTHOPAEDIC SURGERY

## 2022-02-18 PROCEDURE — 99214 PR OFFICE/OUTPT VISIT, EST, LEVL IV, 30-39 MIN: ICD-10-PCS | Mod: 25,S$GLB,, | Performed by: ORTHOPAEDIC SURGERY

## 2022-02-18 PROCEDURE — 1160F PR REVIEW ALL MEDS BY PRESCRIBER/CLIN PHARMACIST DOCUMENTED: ICD-10-PCS | Mod: CPTII,S$GLB,, | Performed by: ORTHOPAEDIC SURGERY

## 2022-02-18 PROCEDURE — 3078F PR MOST RECENT DIASTOLIC BLOOD PRESSURE < 80 MM HG: ICD-10-PCS | Mod: CPTII,S$GLB,, | Performed by: ORTHOPAEDIC SURGERY

## 2022-02-18 PROCEDURE — 3008F BODY MASS INDEX DOCD: CPT | Mod: CPTII,S$GLB,, | Performed by: ORTHOPAEDIC SURGERY

## 2022-02-18 PROCEDURE — 3075F SYST BP GE 130 - 139MM HG: CPT | Mod: CPTII,S$GLB,, | Performed by: ORTHOPAEDIC SURGERY

## 2022-02-18 RX ORDER — CELECOXIB 200 MG/1
200 CAPSULE ORAL 2 TIMES DAILY
Qty: 60 CAPSULE | Refills: 0 | Status: SHIPPED | OUTPATIENT
Start: 2022-02-18

## 2022-02-18 NOTE — PROGRESS NOTES
CC: right elbow pain    21 y.o. Male presents today for evaluation of his right elbow pain. 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 He is here today with his  who was present for the duration of the visit today. He has a history of right UCL reconstruction in October of 2019. He denies completing his physical therapy and return to throwing program due to COVID. He has been appreciating right elbow pain since the fall of 2021 that has increased over the past several weeks. When asked where he hurts he gestures to the medial aspect of the right elbow.   How long: Admits to right elbow pain beginning in the fall of 2021.   What makes it better: Admits to improved pain while at rest and with cupping.   What makes it worse: Admits to increased pain with throwing.   Does it radiate: Denies radiating pain.   Attempted treatments: He has been taking ibuprofen as needed in addition to cupping and soft tissue work with his  which has been somewhat helpful.   Pain score: 7/10  Any mechanical symptoms: Denies mechanical symptoms.   Feelings of instability: Denies feelings of instability.   Problems with ADLs: Denies this problem affecting his ADLs.     REVIEW OF SYSTEMS:   Constitution: Patient denies fever, chills, night sweats, and weight changes.  Eyes: Patient denies eye pain or vision changes.  HENT: Patient denies headache, ear pain, sore throat, or nasal discharge.  CVS: Patient denies chest pain.  Lungs: Patient denies shortness of breath or cough.  Abd: Patient denies stomach pain, nausea, or vomiting.  Skin: Patient denies skin rash or itching.    Hematologic/Lymphatic: Patient denies easy bruising.   Musculoskeletal: Patient denies recent falls. See HPI.  Psych: Patient denies any current anxiety or nervousness.    PAST MEDICAL HISTORY:   History reviewed. No pertinent past medical history.    PAST SURGICAL HISTORY:   History reviewed. No pertinent surgical history.    FAMILY  "HISTORY:   History reviewed. No pertinent family history.    SOCIAL HISTORY:   Social History     Socioeconomic History    Marital status: Single   Tobacco Use    Smoking status: Never Smoker    Smokeless tobacco: Never Used   Substance and Sexual Activity    Alcohol use: No    Drug use: No       MEDICATIONS:     Current Outpatient Medications:     celecoxib (CELEBREX) 200 MG capsule, Take 1 capsule (200 mg total) by mouth 2 (two) times daily., Disp: 60 capsule, Rfl: 0    ALLERGIES:   Review of patient's allergies indicates:  No Known Allergies     PHYSICAL EXAMINATION:  BP (!) 132/55   Pulse 61   Ht 6' 1" (1.854 m)   Wt 105.2 kg (232 lb)   BMI 30.61 kg/m²   Vitals signs and nursing note have been reviewed.  General: In no acute distress, well developed, well nourished, no diaphoresis  Eyes: EOM full and smooth, no eye redness or discharge  HENT: normocephalic and atraumatic, neck supple, trachea midline, no nasal discharge, no external ear redness or discharge  Cardiovascular: no LE edema  Lungs: respirations non-labored, no conversational dyspnea   Abd: non-distended, no rigidity  MSK: no amputation or deformity, no swelling of extremities  Neuro: AAOx3, CN2-12 grossly intact  Skin: No rashes, warm and dry  Psychiatric: cooperative, pleasant, mood and affect appropriate for age    Elbow: RIGHT   The affected elbow is compared to the contralateral elbow.    Observation:    There is no edema, erythema, or ecchymosis.  There is no obvious muscle atrophy, hypertonicity, or hypotonicity of arm muscles.  There is no abnormal carrying angle or gunstock deformity noted.    ROM:  Active flexion to 150° on left and 150° on right.    Active extension to 0° on left and 0° on right without hyperextension.   Active pronation to 80° on left and 80° on right.    Active supination to 80° on left and 80° on right.    Active radial deviation to 20° on left and 20° on right.    Active ulnar deviation to 30° on left and 30° " on right.    Tenderness To Palpation:  + tenderness at the medial epicondyle   No tenderness at lateral epicondyle.  + tenderness at the medial olecranon.  No tenderness at the distal humerus or proximal radius/ulna.  No tenderness at the radial head.  + tenderness over the ulnar collateral ligament  No tenderness at the radial collateral ligament.  No tenderness over the posterior interosseous nerve or distal biceps tendon.    Strength Testing:  Deltoid - 5/5 on left and 5/5 on right  Biceps - 5/5 on left and 5/5 on right  Triceps - 5/5 on left and 5/5 on right  Wrist extension - 5/5 on left and 5/5 on right  Wrist flexion - 5/5 on left and 5/5 on right   - 5/5 on left and 5/5 on right  Finger extension - 5/5 on left and 5/5 on right  Finger abduction - 5/5 on left and 5/5 on right    Special Tests:  No laxity of the MCL at 0 and 30 degrees. Pain present with testing    Milking maneuver - negative.  Pain present with testing  No laxity of the LCL at 0 and 30 degrees.  No laxity with posterolateral and posteromedial rotary testing.    3rd digit extension resistance test - negative  Resisted supination - negative  Resisted pronation - negative  Resisted wrist extension - negative  Resisted wrist flexion - negative    Neurovascular Exam:  2+ radial pulses BL.  Sensation to light touch intact in the distal median, radial, and ulnar nerve distributions bilaterally.    IMAGIN. X-ray ordered due to right elbow pain   2. X-ray images were reviewed personally by me and then directly with patient.  3. FINDINGS: X-ray images obtained demonstrate calcific medial epicondylitis, soft tissue swelling, no fracture, no dislocation.   4. IMPRESSION: As above.       DIAGNOSTIC ULTRASOUND FOCUSED:  Performed on 2022  1. Diagnostic Extremity - MSK-Sports Ultrasound was recommended due to right medial elbow pain, history of UCL repair.  2. Diagnostic Extremity - [facility]-Sports Ultrasound Performed: Taye Call Extremity  Study: right medial elbow.  TECHNIQUE: Real time ultrasound examination of the right medial elbow was performed with Soneverbillte Edge 2, 9-L MHz linear probe(s).   FINDINGS: The images are of adequate diagnostic quality with identification of all echogenic structures made except for the vascular structures unless otherwise noted. There is no sonographic evidence of soft tissue edema, musculotendinous pathology.   There is extensive calcification at the distal aspect the medial elbow joint which correlates with his prior x-rays and history of UCL repair.  The calcifications appear to be extra-articular and are more associated with the area of the tendon attachment and distal UCL which does correlate with prior PRP injection and surgical repair of the ligament.    The ulnar nerve is intact and visualized and is outside of the cubital tunnel which is consistent with the ulnar nerve transposition that occurred during the UCL repair.  There is some flattening of the ulnar nerve at the level of the elbow without matting or obvious fascicular enlargement.    With stress testing of the UCL, there is minimal to no medial elbow joint motion which is reassuring that the UCL repair is still intact.  The ligament was visualized and did not demonstrate any laxity at the proximal or distal attachments.   The rest of the sonographic examination was unremarkable.  Ultrasound images were directly reviewed with the patient and then a treatment plan was discussed.  IMPRESSION:  As above.      ASSESSMENT:      ICD-10-CM ICD-9-CM   1. Right elbow pain  M25.521 719.42   2. History of arthroscopic surgery of elbow  Z98.890 V45.89         PLAN:  1-2.  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 He has a history of right UCL reconstruction in October of 2019 and has been appreciating increased right medial elbow pain since the fall of 2021 without new mechanism of  injury.     - XRs ordered in the office today and images were personally reviewed with the patient. See above for further detail.    - Diagnostic ultrasound of the right elbow was performed today to assess the medial elbow structures dynamically. See above for further detail.    - Referral to physical therapy placed today.  He did not undergo formal physical therapy following his UCL surgery due to COVID and as such he needs to work on pronator strengthening and strengthening/stabilizing exercises of the forearm, upper arm, shoulder structures.    - Celebrex 200 mg twice daily for 2 weeks followed by his needed.    - MRI-arthrogram of the right elbow ordered.  The diagnostic ultrasound today was reassuring, but based on his surgical history and acute level of pain this will help to decide play allowance.    - His  was present during the entire examination and discussion today.  He is on board with the plan.  Limit throwing and activities that cause medial elbow pain at this time.      Future planning includes - pending MRI arthrogram    All questions were answered to the best of my ability and all concerns were addressed at this time.    Follow up in [place] athletic training room as needed.       This note is dictated using the M*Modal Fluency Direct word recognition program. There are word recognition mistakes that are occasionally missed on review.

## 2022-02-19 NOTE — PROGRESS NOTES
DIAGNOSTIC ULTRASOUND FOCUSED:  Performed on 02/18/2022  1. Diagnostic Extremity - [facility]-Sports Ultrasound was recommended due to right medial elbow pain, history of UCL repair.  2. Diagnostic Extremity - [facility]-Sports Ultrasound Performed: Taye Call Extremity Study: right medial elbow.    TECHNIQUE: Real time ultrasound examination of the right medial elbow was performed with SonoSite Edge 2, 9-L MHz linear probe(s).     FINDINGS: The images are of adequate diagnostic quality with identification of all echogenic structures made except for the vascular structures unless otherwise noted. There is no sonographic evidence of soft tissue edema, musculotendinous pathology.     There is extensive calcification at the distal aspect the medial elbow joint which correlates with his prior x-rays and history of UCL repair.  The calcifications appear to be extra-articular and are more associated with the area of the tendon attachment and distal UCL which does correlate with prior PRP injection and surgical repair of the ligament.      The ulnar nerve is intact and visualized and is outside of the cubital tunnel which is consistent with the ulnar nerve transposition that occurred during the UCL repair.  There is some flattening of the ulnar nerve at the level of the elbow without matting or obvious fascicular enlargement.      With stress testing of the UCL, there is minimal to no medial elbow joint motion which is reassuring that the UCL repair is still intact.  The ligament was visualized and did not demonstrate any laxity at the proximal or distal attachments.    The rest of the sonographic examination was unremarkable.  Ultrasound images were directly reviewed with the patient and then a treatment plan was discussed.    IMPRESSION:  As above.

## 2022-08-12 NOTE — PROGRESS NOTES
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    ATC administered fascial cupping for the patient.    Patient Reports Relief of Symptoms After Treatment: Yes    Plan to treat as needed.      Mark ISRAEL, LAT, ATC    [employer] [facility]